# Patient Record
Sex: FEMALE | Race: WHITE | Employment: UNEMPLOYED | URBAN - NONMETROPOLITAN AREA
[De-identification: names, ages, dates, MRNs, and addresses within clinical notes are randomized per-mention and may not be internally consistent; named-entity substitution may affect disease eponyms.]

---

## 2018-09-16 ENCOUNTER — HOSPITAL ENCOUNTER (EMERGENCY)
Age: 33
Discharge: HOME | End: 2018-09-16
Attending: EMERGENCY MEDICINE
Payer: MEDICARE

## 2018-09-16 VITALS
BODY MASS INDEX: 37.3 KG/M2 | RESPIRATION RATE: 16 BRPM | WEIGHT: 190 LBS | OXYGEN SATURATION: 100 % | HEART RATE: 89 BPM | TEMPERATURE: 98.3 F | HEIGHT: 60 IN | SYSTOLIC BLOOD PRESSURE: 129 MMHG | DIASTOLIC BLOOD PRESSURE: 82 MMHG

## 2018-09-16 DIAGNOSIS — R10.9 ACUTE ABDOMINAL PAIN: Primary | ICD-10-CM

## 2018-09-16 DIAGNOSIS — R11.2 NON-INTRACTABLE VOMITING WITH NAUSEA, UNSPECIFIED VOMITING TYPE: ICD-10-CM

## 2018-09-16 LAB
ALBUMIN SERPL-MCNC: 4 G/DL (ref 3.4–5)
ALP BLD-CCNC: 87 U/L (ref 40–129)
ALT SERPL-CCNC: 88 U/L (ref 10–40)
ANION GAP SERPL CALCULATED.3IONS-SCNC: 10 MMOL/L (ref 3–16)
AST SERPL-CCNC: 44 U/L (ref 15–37)
BASOPHILS ABSOLUTE: 0.1 K/UL (ref 0–0.2)
BASOPHILS RELATIVE PERCENT: 0.8 %
BILIRUB SERPL-MCNC: 0.3 MG/DL (ref 0–1)
BILIRUBIN DIRECT: <0.2 MG/DL (ref 0–0.3)
BILIRUBIN URINE: NEGATIVE
BILIRUBIN, INDIRECT: ABNORMAL MG/DL (ref 0–1)
BLOOD, URINE: NEGATIVE
BUN BLDV-MCNC: 20 MG/DL (ref 7–20)
CALCIUM SERPL-MCNC: 9.2 MG/DL (ref 8.3–10.6)
CHLORIDE BLD-SCNC: 102 MMOL/L (ref 99–110)
CLARITY: CLEAR
CO2: 27 MMOL/L (ref 21–32)
COLOR: YELLOW
CREAT SERPL-MCNC: 0.7 MG/DL (ref 0.6–1.1)
EOSINOPHILS ABSOLUTE: 0.3 K/UL (ref 0–0.6)
EOSINOPHILS RELATIVE PERCENT: 3.1 %
GFR AFRICAN AMERICAN: >60
GFR NON-AFRICAN AMERICAN: >60
GLUCOSE BLD-MCNC: 86 MG/DL (ref 70–99)
GLUCOSE URINE: NEGATIVE MG/DL
HCG(URINE) PREGNANCY TEST: NEGATIVE
HCT VFR BLD CALC: 39.3 % (ref 36–48)
HEMOGLOBIN: 13.1 G/DL (ref 12–16)
KETONES, URINE: NEGATIVE MG/DL
LEUKOCYTE ESTERASE, URINE: NEGATIVE
LIPASE: 16 U/L (ref 13–60)
LYMPHOCYTES ABSOLUTE: 2.1 K/UL (ref 1–5.1)
LYMPHOCYTES RELATIVE PERCENT: 22.4 %
MAGNESIUM: 2.1 MG/DL (ref 1.8–2.4)
MCH RBC QN AUTO: 29.2 PG (ref 26–34)
MCHC RBC AUTO-ENTMCNC: 33.3 G/DL (ref 31–36)
MCV RBC AUTO: 87.9 FL (ref 80–100)
MICROSCOPIC EXAMINATION: NORMAL
MONOCYTES ABSOLUTE: 0.7 K/UL (ref 0–1.3)
MONOCYTES RELATIVE PERCENT: 7.1 %
NEUTROPHILS ABSOLUTE: 6.2 K/UL (ref 1.7–7.7)
NEUTROPHILS RELATIVE PERCENT: 66.6 %
NITRITE, URINE: NEGATIVE
PDW BLD-RTO: 13.8 % (ref 12.4–15.4)
PH UA: 6
PLATELET # BLD: 336 K/UL (ref 135–450)
PMV BLD AUTO: 7.3 FL (ref 5–10.5)
POTASSIUM SERPL-SCNC: 4.2 MMOL/L (ref 3.5–5.1)
PROTEIN UA: NEGATIVE MG/DL
RBC # BLD: 4.47 M/UL (ref 4–5.2)
SODIUM BLD-SCNC: 139 MMOL/L (ref 136–145)
SPECIFIC GRAVITY UA: 1.01
TOTAL PROTEIN: 7.8 G/DL (ref 6.4–8.2)
URINE REFLEX TO CULTURE: NORMAL
URINE TYPE: NORMAL
UROBILINOGEN, URINE: 0.2 E.U./DL
WBC # BLD: 9.3 K/UL (ref 4–11)

## 2018-09-16 PROCEDURE — 83735 ASSAY OF MAGNESIUM: CPT

## 2018-09-16 PROCEDURE — 85025 COMPLETE CBC W/AUTO DIFF WBC: CPT

## 2018-09-16 PROCEDURE — 96374 THER/PROPH/DIAG INJ IV PUSH: CPT

## 2018-09-16 PROCEDURE — 81003 URINALYSIS AUTO W/O SCOPE: CPT

## 2018-09-16 PROCEDURE — 80048 BASIC METABOLIC PNL TOTAL CA: CPT

## 2018-09-16 PROCEDURE — 36415 COLL VENOUS BLD VENIPUNCTURE: CPT

## 2018-09-16 PROCEDURE — 83690 ASSAY OF LIPASE: CPT

## 2018-09-16 PROCEDURE — 80076 HEPATIC FUNCTION PANEL: CPT

## 2018-09-16 PROCEDURE — 2580000003 HC RX 258: Performed by: EMERGENCY MEDICINE

## 2018-09-16 PROCEDURE — 6360000002 HC RX W HCPCS: Performed by: EMERGENCY MEDICINE

## 2018-09-16 PROCEDURE — 84703 CHORIONIC GONADOTROPIN ASSAY: CPT

## 2018-09-16 PROCEDURE — 96375 TX/PRO/DX INJ NEW DRUG ADDON: CPT

## 2018-09-16 PROCEDURE — 99284 EMERGENCY DEPT VISIT MOD MDM: CPT

## 2018-09-16 PROCEDURE — 96361 HYDRATE IV INFUSION ADD-ON: CPT

## 2018-09-16 RX ORDER — PROMETHAZINE HYDROCHLORIDE 25 MG/1
25 TABLET ORAL EVERY 6 HOURS PRN
Qty: 20 TABLET | Refills: 0 | Status: SHIPPED | OUTPATIENT
Start: 2018-09-16 | End: 2018-09-23

## 2018-09-16 RX ORDER — ONDANSETRON 2 MG/ML
4 INJECTION INTRAMUSCULAR; INTRAVENOUS ONCE
Status: COMPLETED | OUTPATIENT
Start: 2018-09-16 | End: 2018-09-16

## 2018-09-16 RX ORDER — 0.9 % SODIUM CHLORIDE 0.9 %
1000 INTRAVENOUS SOLUTION INTRAVENOUS ONCE
Status: COMPLETED | OUTPATIENT
Start: 2018-09-16 | End: 2018-09-16

## 2018-09-16 RX ORDER — DICYCLOMINE HYDROCHLORIDE 10 MG/1
10 CAPSULE ORAL EVERY 6 HOURS PRN
Qty: 20 CAPSULE | Refills: 0 | Status: SHIPPED | OUTPATIENT
Start: 2018-09-16 | End: 2018-10-04

## 2018-09-16 RX ORDER — KETOROLAC TROMETHAMINE 30 MG/ML
30 INJECTION, SOLUTION INTRAMUSCULAR; INTRAVENOUS ONCE
Status: COMPLETED | OUTPATIENT
Start: 2018-09-16 | End: 2018-09-16

## 2018-09-16 RX ORDER — FAMOTIDINE 40 MG/1
40 TABLET, FILM COATED ORAL DAILY
Qty: 30 TABLET | Refills: 0 | Status: SHIPPED | OUTPATIENT
Start: 2018-09-16 | End: 2018-10-04

## 2018-09-16 RX ADMIN — ONDANSETRON 4 MG: 2 INJECTION INTRAMUSCULAR; INTRAVENOUS at 10:48

## 2018-09-16 RX ADMIN — SODIUM CHLORIDE 1000 ML: 9 INJECTION, SOLUTION INTRAVENOUS at 10:48

## 2018-09-16 RX ADMIN — KETOROLAC TROMETHAMINE 30 MG: 30 INJECTION, SOLUTION INTRAMUSCULAR at 10:48

## 2018-09-16 ASSESSMENT — PAIN DESCRIPTION - DESCRIPTORS: DESCRIPTORS: SHARP

## 2018-09-16 ASSESSMENT — PAIN SCALES - GENERAL: PAINLEVEL_OUTOF10: 7

## 2018-09-16 ASSESSMENT — PAIN DESCRIPTION - LOCATION: LOCATION: ABDOMEN

## 2018-09-16 ASSESSMENT — PAIN DESCRIPTION - PAIN TYPE: TYPE: ACUTE PAIN

## 2018-09-16 ASSESSMENT — PAIN DESCRIPTION - ORIENTATION: ORIENTATION: LEFT

## 2018-09-16 NOTE — ED PROVIDER NOTES
AM      PATIENT REFERRED TO:  [facility]) Pre-Services  [phone]  Schedule an appointment as soon as possible for a visit       [place] [facility] Emergency Department  Britni Chow [address] [facility], [address] [place] [address]  In 1 day  As needed, If symptoms worsen      DISCHARGE MEDICATIONS:  Discharge Medication List as of 9/16/2018 11:39 AM      START taking these medications    Details   famotidine (PEPCID) 40 MG tablet Take 1 tablet by mouth daily, Disp-30 tablet, R-0Print      promethazine (PHENERGAN) 25 MG tablet Take 1 tablet by mouth every 6 hours as needed for Nausea WARNING:  May cause drowsiness. May impair ability to operate vehicles or machinery.   Do not use in combination with alcohol., Disp-20 tablet, R-0Print      dicyclomine (BENTYL) 10 MG capsule Take 1 capsule by mouth every 6 hours as needed (cramps), Disp-20 capsule, R-0Print             DISCONTINUED MEDICATIONS:  Discharge Medication List as of 9/16/2018 11:39 AM      STOP taking these medications       HYDROcodone-acetaminophen (NORCO) 5-325 MG per tablet Comments:   Reason for Stopping:                      (Please note that portions of this note were completed with a voice recognition program.  Efforts were made to edit the dictations but occasionally words are mis-transcribed.)    Demetrius Cazares DO (electronically signed)            Sukhjinder Montiel DO  09/16/18 1811

## 2018-10-04 ENCOUNTER — APPOINTMENT (OUTPATIENT)
Dept: CT IMAGING | Age: 33
End: 2018-10-04
Payer: MEDICARE

## 2018-10-04 ENCOUNTER — HOSPITAL ENCOUNTER (EMERGENCY)
Age: 33
Discharge: HOME | End: 2018-10-04
Attending: EMERGENCY MEDICINE | Admitting: EMERGENCY MEDICINE
Payer: MEDICARE

## 2018-10-04 VITALS
SYSTOLIC BLOOD PRESSURE: 105 MMHG | OXYGEN SATURATION: 100 % | DIASTOLIC BLOOD PRESSURE: 73 MMHG | TEMPERATURE: 98.4 F | HEIGHT: 60 IN | BODY MASS INDEX: 37.3 KG/M2 | RESPIRATION RATE: 19 BRPM | WEIGHT: 190 LBS | HEART RATE: 75 BPM

## 2018-10-04 DIAGNOSIS — R10.30 LOWER ABDOMINAL PAIN: Primary | ICD-10-CM

## 2018-10-04 LAB
A/G RATIO: 1.2 (ref 1.1–2.2)
ALBUMIN SERPL-MCNC: 4.4 G/DL (ref 3.4–5)
ALP BLD-CCNC: 96 U/L (ref 40–129)
ALT SERPL-CCNC: 73 U/L (ref 10–40)
ANION GAP SERPL CALCULATED.3IONS-SCNC: 13 MMOL/L (ref 3–16)
AST SERPL-CCNC: 44 U/L (ref 15–37)
BASOPHILS ABSOLUTE: 0.1 K/UL (ref 0–0.2)
BASOPHILS RELATIVE PERCENT: 1.1 %
BILIRUB SERPL-MCNC: 0.6 MG/DL (ref 0–1)
BILIRUBIN URINE: NEGATIVE
BLOOD, URINE: NEGATIVE
BUN BLDV-MCNC: 13 MG/DL (ref 7–20)
CALCIUM SERPL-MCNC: 9.4 MG/DL (ref 8.3–10.6)
CHLORIDE BLD-SCNC: 103 MMOL/L (ref 99–110)
CLARITY: CLEAR
CO2: 25 MMOL/L (ref 21–32)
COLOR: YELLOW
CREAT SERPL-MCNC: 0.7 MG/DL (ref 0.6–1.1)
EOSINOPHILS ABSOLUTE: 0.1 K/UL (ref 0–0.6)
EOSINOPHILS RELATIVE PERCENT: 0.9 %
GFR AFRICAN AMERICAN: >60
GFR NON-AFRICAN AMERICAN: >60
GLOBULIN: 3.8 G/DL
GLUCOSE BLD-MCNC: 88 MG/DL (ref 70–99)
GLUCOSE URINE: NEGATIVE MG/DL
HCG(URINE) PREGNANCY TEST: NEGATIVE
HCT VFR BLD CALC: 40.3 % (ref 36–48)
HEMOGLOBIN: 13.8 G/DL (ref 12–16)
KETONES, URINE: NEGATIVE MG/DL
LEUKOCYTE ESTERASE, URINE: NEGATIVE
LIPASE: 14 U/L (ref 13–60)
LYMPHOCYTES ABSOLUTE: 2.5 K/UL (ref 1–5.1)
LYMPHOCYTES RELATIVE PERCENT: 23.3 %
MCH RBC QN AUTO: 29.8 PG (ref 26–34)
MCHC RBC AUTO-ENTMCNC: 34.3 G/DL (ref 31–36)
MCV RBC AUTO: 87.1 FL (ref 80–100)
MICROSCOPIC EXAMINATION: NORMAL
MONOCYTES ABSOLUTE: 0.6 K/UL (ref 0–1.3)
MONOCYTES RELATIVE PERCENT: 5.7 %
NEUTROPHILS ABSOLUTE: 7.4 K/UL (ref 1.7–7.7)
NEUTROPHILS RELATIVE PERCENT: 69 %
NITRITE, URINE: NEGATIVE
PDW BLD-RTO: 13.6 % (ref 12.4–15.4)
PH UA: 6
PLATELET # BLD: 362 K/UL (ref 135–450)
PMV BLD AUTO: 8 FL (ref 5–10.5)
POTASSIUM SERPL-SCNC: 3.8 MMOL/L (ref 3.5–5.1)
PROTEIN UA: NEGATIVE MG/DL
RBC # BLD: 4.63 M/UL (ref 4–5.2)
SODIUM BLD-SCNC: 141 MMOL/L (ref 136–145)
SPECIFIC GRAVITY UA: 1.01
TOTAL PROTEIN: 8.2 G/DL (ref 6.4–8.2)
URINE TYPE: NORMAL
UROBILINOGEN, URINE: 0.2 E.U./DL
WBC # BLD: 10.7 K/UL (ref 4–11)

## 2018-10-04 PROCEDURE — 96376 TX/PRO/DX INJ SAME DRUG ADON: CPT

## 2018-10-04 PROCEDURE — 96375 TX/PRO/DX INJ NEW DRUG ADDON: CPT

## 2018-10-04 PROCEDURE — 6360000004 HC RX CONTRAST MEDICATION: Performed by: PHYSICIAN ASSISTANT

## 2018-10-04 PROCEDURE — 80053 COMPREHEN METABOLIC PANEL: CPT

## 2018-10-04 PROCEDURE — 74177 CT ABD & PELVIS W/CONTRAST: CPT

## 2018-10-04 PROCEDURE — 2580000003 HC RX 258: Performed by: PHYSICIAN ASSISTANT

## 2018-10-04 PROCEDURE — 81003 URINALYSIS AUTO W/O SCOPE: CPT

## 2018-10-04 PROCEDURE — 85025 COMPLETE CBC W/AUTO DIFF WBC: CPT

## 2018-10-04 PROCEDURE — 99284 EMERGENCY DEPT VISIT MOD MDM: CPT

## 2018-10-04 PROCEDURE — 96374 THER/PROPH/DIAG INJ IV PUSH: CPT

## 2018-10-04 PROCEDURE — 96361 HYDRATE IV INFUSION ADD-ON: CPT

## 2018-10-04 PROCEDURE — 83690 ASSAY OF LIPASE: CPT

## 2018-10-04 PROCEDURE — 6360000002 HC RX W HCPCS

## 2018-10-04 PROCEDURE — 6360000002 HC RX W HCPCS: Performed by: PHYSICIAN ASSISTANT

## 2018-10-04 PROCEDURE — 84703 CHORIONIC GONADOTROPIN ASSAY: CPT

## 2018-10-04 RX ORDER — NAPROXEN 500 MG/1
500 TABLET ORAL 2 TIMES DAILY
Qty: 20 TABLET | Refills: 0 | Status: SHIPPED | OUTPATIENT
Start: 2018-10-04 | End: 2018-10-14

## 2018-10-04 RX ORDER — ONDANSETRON 2 MG/ML
4 INJECTION INTRAMUSCULAR; INTRAVENOUS ONCE
Status: COMPLETED | OUTPATIENT
Start: 2018-10-04 | End: 2018-10-04

## 2018-10-04 RX ORDER — ONDANSETRON 4 MG/1
4-8 TABLET, ORALLY DISINTEGRATING ORAL EVERY 12 HOURS PRN
Qty: 12 TABLET | Refills: 0 | Status: SHIPPED | OUTPATIENT
Start: 2018-10-04 | End: 2019-01-02

## 2018-10-04 RX ORDER — 0.9 % SODIUM CHLORIDE 0.9 %
1000 INTRAVENOUS SOLUTION INTRAVENOUS ONCE
Status: COMPLETED | OUTPATIENT
Start: 2018-10-04 | End: 2018-10-04

## 2018-10-04 RX ORDER — MORPHINE SULFATE 2 MG/ML
4 INJECTION, SOLUTION INTRAMUSCULAR; INTRAVENOUS ONCE
Status: COMPLETED | OUTPATIENT
Start: 2018-10-04 | End: 2018-10-04

## 2018-10-04 RX ORDER — KETOROLAC TROMETHAMINE 30 MG/ML
30 INJECTION, SOLUTION INTRAMUSCULAR; INTRAVENOUS ONCE
Status: COMPLETED | OUTPATIENT
Start: 2018-10-04 | End: 2018-10-04

## 2018-10-04 RX ORDER — ONDANSETRON 2 MG/ML
INJECTION INTRAMUSCULAR; INTRAVENOUS
Status: COMPLETED
Start: 2018-10-04 | End: 2018-10-04

## 2018-10-04 RX ADMIN — MORPHINE SULFATE 4 MG: 2 INJECTION, SOLUTION INTRAMUSCULAR; INTRAVENOUS at 12:35

## 2018-10-04 RX ADMIN — IOPAMIDOL 75 ML: 755 INJECTION, SOLUTION INTRAVENOUS at 13:02

## 2018-10-04 RX ADMIN — SODIUM CHLORIDE 1000 ML: 9 INJECTION, SOLUTION INTRAVENOUS at 11:27

## 2018-10-04 RX ADMIN — ONDANSETRON 4 MG: 2 INJECTION INTRAMUSCULAR; INTRAVENOUS at 12:33

## 2018-10-04 RX ADMIN — ONDANSETRON 4 MG: 2 INJECTION INTRAMUSCULAR; INTRAVENOUS at 11:27

## 2018-10-04 RX ADMIN — KETOROLAC TROMETHAMINE 30 MG: 30 INJECTION, SOLUTION INTRAMUSCULAR at 11:27

## 2018-10-04 ASSESSMENT — PAIN DESCRIPTION - PAIN TYPE
TYPE: ACUTE PAIN

## 2018-10-04 ASSESSMENT — PAIN SCALES - GENERAL
PAINLEVEL_OUTOF10: 6
PAINLEVEL_OUTOF10: 8
PAINLEVEL_OUTOF10: 7
PAINLEVEL_OUTOF10: 8
PAINLEVEL_OUTOF10: 8

## 2018-10-04 ASSESSMENT — PAIN DESCRIPTION - ORIENTATION: ORIENTATION: RIGHT;LOWER

## 2018-10-04 ASSESSMENT — PAIN DESCRIPTION - LOCATION
LOCATION: FLANK
LOCATION: ABDOMEN;BACK

## 2018-10-09 ENCOUNTER — HOSPITAL ENCOUNTER (EMERGENCY)
Age: 33
Discharge: HOME | End: 2018-10-09
Attending: EMERGENCY MEDICINE
Payer: MEDICARE

## 2018-10-09 ENCOUNTER — APPOINTMENT (OUTPATIENT)
Dept: GENERAL RADIOLOGY | Age: 33
End: 2018-10-09
Payer: MEDICARE

## 2018-10-09 VITALS
OXYGEN SATURATION: 98 % | DIASTOLIC BLOOD PRESSURE: 74 MMHG | BODY MASS INDEX: 37.3 KG/M2 | SYSTOLIC BLOOD PRESSURE: 113 MMHG | RESPIRATION RATE: 18 BRPM | HEART RATE: 86 BPM | HEIGHT: 60 IN | WEIGHT: 190 LBS | TEMPERATURE: 98.4 F

## 2018-10-09 DIAGNOSIS — J40 BRONCHITIS: Primary | ICD-10-CM

## 2018-10-09 PROCEDURE — 71046 X-RAY EXAM CHEST 2 VIEWS: CPT

## 2018-10-09 PROCEDURE — 99283 EMERGENCY DEPT VISIT LOW MDM: CPT

## 2018-10-09 RX ORDER — AZITHROMYCIN 250 MG/1
TABLET, FILM COATED ORAL
Qty: 1 PACKET | Refills: 0 | Status: SHIPPED | OUTPATIENT
Start: 2018-10-09 | End: 2018-10-13

## 2018-10-09 ASSESSMENT — PAIN DESCRIPTION - PAIN TYPE: TYPE: ACUTE PAIN

## 2018-10-09 ASSESSMENT — PAIN SCALES - GENERAL: PAINLEVEL_OUTOF10: 3

## 2018-10-09 ASSESSMENT — PAIN DESCRIPTION - LOCATION: LOCATION: THROAT

## 2018-10-09 ASSESSMENT — PAIN DESCRIPTION - PROGRESSION: CLINICAL_PROGRESSION: GRADUALLY WORSENING

## 2018-10-09 ASSESSMENT — PAIN DESCRIPTION - FREQUENCY: FREQUENCY: CONTINUOUS

## 2018-10-09 ASSESSMENT — PAIN DESCRIPTION - DESCRIPTORS: DESCRIPTORS: SORE

## 2019-01-02 ENCOUNTER — APPOINTMENT (OUTPATIENT)
Dept: ULTRASOUND IMAGING | Age: 34
End: 2019-01-02
Payer: MEDICARE

## 2019-01-02 ENCOUNTER — HOSPITAL ENCOUNTER (EMERGENCY)
Age: 34
Discharge: HOME | End: 2019-01-02
Payer: MEDICARE

## 2019-01-02 VITALS
RESPIRATION RATE: 12 BRPM | BODY MASS INDEX: 35.34 KG/M2 | WEIGHT: 180 LBS | HEIGHT: 60 IN | DIASTOLIC BLOOD PRESSURE: 86 MMHG | SYSTOLIC BLOOD PRESSURE: 109 MMHG | TEMPERATURE: 98.2 F | HEART RATE: 68 BPM | OXYGEN SATURATION: 100 %

## 2019-01-02 DIAGNOSIS — B96.89 BV (BACTERIAL VAGINOSIS): ICD-10-CM

## 2019-01-02 DIAGNOSIS — N73.0 PID (ACUTE PELVIC INFLAMMATORY DISEASE): Primary | ICD-10-CM

## 2019-01-02 DIAGNOSIS — N76.0 BV (BACTERIAL VAGINOSIS): ICD-10-CM

## 2019-01-02 LAB
A/G RATIO: 1.3 (ref 1.1–2.2)
ALBUMIN SERPL-MCNC: 3.9 G/DL (ref 3.4–5)
ALP BLD-CCNC: 100 U/L (ref 40–129)
ALT SERPL-CCNC: 104 U/L (ref 10–40)
ANION GAP SERPL CALCULATED.3IONS-SCNC: 8 MMOL/L (ref 3–16)
AST SERPL-CCNC: 56 U/L (ref 15–37)
BACTERIA WET PREP: ABNORMAL
BASOPHILS ABSOLUTE: 0.1 K/UL (ref 0–0.2)
BASOPHILS RELATIVE PERCENT: 0.6 %
BILIRUB SERPL-MCNC: 0.5 MG/DL (ref 0–1)
BILIRUBIN URINE: NEGATIVE
BLOOD, URINE: NEGATIVE
BUN BLDV-MCNC: 10 MG/DL (ref 7–20)
CALCIUM SERPL-MCNC: 9.4 MG/DL (ref 8.3–10.6)
CHLORIDE BLD-SCNC: 106 MMOL/L (ref 99–110)
CLARITY: CLEAR
CLUE CELLS: ABNORMAL
CO2: 25 MMOL/L (ref 21–32)
COLOR: NORMAL
CREAT SERPL-MCNC: 0.6 MG/DL (ref 0.6–1.1)
EOSINOPHILS ABSOLUTE: 0.1 K/UL (ref 0–0.6)
EOSINOPHILS RELATIVE PERCENT: 1 %
EPITHELIAL CELLS WET PREP: ABNORMAL
GFR AFRICAN AMERICAN: >60
GFR NON-AFRICAN AMERICAN: >60
GLOBULIN: 2.9 G/DL
GLUCOSE BLD-MCNC: 86 MG/DL (ref 70–99)
GLUCOSE URINE: NEGATIVE MG/DL
HCG QUALITATIVE: NEGATIVE
HCT VFR BLD CALC: 39.1 % (ref 36–48)
HEMOGLOBIN: 12.5 G/DL (ref 12–16)
KETONES, URINE: NEGATIVE MG/DL
LACTIC ACID: 0.5 MMOL/L (ref 0.4–2)
LEUKOCYTE ESTERASE, URINE: NEGATIVE
LIPASE: 11 U/L (ref 13–60)
LYMPHOCYTES ABSOLUTE: 2.6 K/UL (ref 1–5.1)
LYMPHOCYTES RELATIVE PERCENT: 20.2 %
MCH RBC QN AUTO: 28.9 PG (ref 26–34)
MCHC RBC AUTO-ENTMCNC: 32 G/DL (ref 31–36)
MCV RBC AUTO: 90.4 FL (ref 80–100)
MICROSCOPIC EXAMINATION: NORMAL
MONOCYTES ABSOLUTE: 0.6 K/UL (ref 0–1.3)
MONOCYTES RELATIVE PERCENT: 4.5 %
NEUTROPHILS ABSOLUTE: 9.6 K/UL (ref 1.7–7.7)
NEUTROPHILS RELATIVE PERCENT: 73.7 %
NITRITE, URINE: NEGATIVE
PDW BLD-RTO: 13.9 % (ref 12.4–15.4)
PH UA: 7
PLATELET # BLD: 309 K/UL (ref 135–450)
PLATELET SLIDE REVIEW: ADEQUATE
PMV BLD AUTO: 8.5 FL (ref 5–10.5)
POTASSIUM REFLEX MAGNESIUM: 4.4 MMOL/L (ref 3.5–5.1)
PROTEIN UA: NEGATIVE MG/DL
RBC # BLD: 4.33 M/UL (ref 4–5.2)
RBC WET PREP: ABNORMAL
SLIDE REVIEW: ABNORMAL
SODIUM BLD-SCNC: 139 MMOL/L (ref 136–145)
SOURCE WET PREP: ABNORMAL
SPECIFIC GRAVITY UA: <=1.005
TOTAL PROTEIN: 6.8 G/DL (ref 6.4–8.2)
TRICHOMONAS PREP: ABNORMAL
URINE TYPE: NORMAL
UROBILINOGEN, URINE: 0.2 E.U./DL
WBC # BLD: 13 K/UL (ref 4–11)
WBC WET PREP: ABNORMAL
YEAST WET PREP: ABNORMAL

## 2019-01-02 PROCEDURE — 84703 CHORIONIC GONADOTROPIN ASSAY: CPT

## 2019-01-02 PROCEDURE — 96361 HYDRATE IV INFUSION ADD-ON: CPT

## 2019-01-02 PROCEDURE — 80053 COMPREHEN METABOLIC PANEL: CPT

## 2019-01-02 PROCEDURE — 6360000002 HC RX W HCPCS: Performed by: NURSE PRACTITIONER

## 2019-01-02 PROCEDURE — 96372 THER/PROPH/DIAG INJ SC/IM: CPT

## 2019-01-02 PROCEDURE — 83690 ASSAY OF LIPASE: CPT

## 2019-01-02 PROCEDURE — 83605 ASSAY OF LACTIC ACID: CPT

## 2019-01-02 PROCEDURE — 81003 URINALYSIS AUTO W/O SCOPE: CPT

## 2019-01-02 PROCEDURE — 96375 TX/PRO/DX INJ NEW DRUG ADDON: CPT

## 2019-01-02 PROCEDURE — 2580000003 HC RX 258: Performed by: NURSE PRACTITIONER

## 2019-01-02 PROCEDURE — 76856 US EXAM PELVIC COMPLETE: CPT

## 2019-01-02 PROCEDURE — 87210 SMEAR WET MOUNT SALINE/INK: CPT

## 2019-01-02 PROCEDURE — 85025 COMPLETE CBC W/AUTO DIFF WBC: CPT

## 2019-01-02 PROCEDURE — 87491 CHLMYD TRACH DNA AMP PROBE: CPT

## 2019-01-02 PROCEDURE — 99284 EMERGENCY DEPT VISIT MOD MDM: CPT

## 2019-01-02 PROCEDURE — 96374 THER/PROPH/DIAG INJ IV PUSH: CPT

## 2019-01-02 PROCEDURE — 87591 N.GONORRHOEAE DNA AMP PROB: CPT

## 2019-01-02 PROCEDURE — 6370000000 HC RX 637 (ALT 250 FOR IP): Performed by: NURSE PRACTITIONER

## 2019-01-02 RX ORDER — METRONIDAZOLE 250 MG/1
500 TABLET ORAL ONCE
Status: COMPLETED | OUTPATIENT
Start: 2019-01-02 | End: 2019-01-02

## 2019-01-02 RX ORDER — DOXYCYCLINE HYCLATE 100 MG
100 TABLET ORAL ONCE
Status: COMPLETED | OUTPATIENT
Start: 2019-01-02 | End: 2019-01-02

## 2019-01-02 RX ORDER — METRONIDAZOLE 500 MG/1
500 TABLET ORAL 2 TIMES DAILY
Qty: 20 TABLET | Refills: 0 | Status: SHIPPED | OUTPATIENT
Start: 2019-01-02 | End: 2019-01-12

## 2019-01-02 RX ORDER — CEFTRIAXONE SODIUM 250 MG/1
250 INJECTION, POWDER, FOR SOLUTION INTRAMUSCULAR; INTRAVENOUS ONCE
Status: COMPLETED | OUTPATIENT
Start: 2019-01-02 | End: 2019-01-02

## 2019-01-02 RX ORDER — MORPHINE SULFATE 2 MG/ML
4 INJECTION, SOLUTION INTRAMUSCULAR; INTRAVENOUS ONCE
Status: COMPLETED | OUTPATIENT
Start: 2019-01-02 | End: 2019-01-02

## 2019-01-02 RX ORDER — 0.9 % SODIUM CHLORIDE 0.9 %
1000 INTRAVENOUS SOLUTION INTRAVENOUS ONCE
Status: COMPLETED | OUTPATIENT
Start: 2019-01-02 | End: 2019-01-02

## 2019-01-02 RX ORDER — DOXYCYCLINE 100 MG/1
100 TABLET ORAL 2 TIMES DAILY
Qty: 20 TABLET | Refills: 0 | Status: SHIPPED | OUTPATIENT
Start: 2019-01-02 | End: 2019-01-12

## 2019-01-02 RX ORDER — ONDANSETRON 2 MG/ML
4 INJECTION INTRAMUSCULAR; INTRAVENOUS ONCE
Status: COMPLETED | OUTPATIENT
Start: 2019-01-02 | End: 2019-01-02

## 2019-01-02 RX ADMIN — MORPHINE SULFATE 4 MG: 2 INJECTION, SOLUTION INTRAMUSCULAR; INTRAVENOUS at 14:32

## 2019-01-02 RX ADMIN — CEFTRIAXONE SODIUM 250 MG: 250 INJECTION, POWDER, FOR SOLUTION INTRAMUSCULAR; INTRAVENOUS at 16:58

## 2019-01-02 RX ADMIN — DOXYCYCLINE HYCLATE 100 MG: 100 TABLET, COATED ORAL at 16:58

## 2019-01-02 RX ADMIN — ONDANSETRON 4 MG: 2 INJECTION INTRAMUSCULAR; INTRAVENOUS at 14:32

## 2019-01-02 RX ADMIN — SODIUM CHLORIDE 1000 ML: 9 INJECTION, SOLUTION INTRAVENOUS at 13:25

## 2019-01-02 RX ADMIN — METRONIDAZOLE 500 MG: 250 TABLET ORAL at 16:58

## 2019-01-02 ASSESSMENT — ENCOUNTER SYMPTOMS
SHORTNESS OF BREATH: 0
ABDOMINAL PAIN: 1
VOMITING: 1

## 2019-01-02 ASSESSMENT — PAIN SCALES - WONG BAKER
WONGBAKER_NUMERICALRESPONSE: 6
WONGBAKER_NUMERICALRESPONSE: 4

## 2019-01-02 ASSESSMENT — PAIN DESCRIPTION - LOCATION
LOCATION: ABDOMEN
LOCATION: VAGINA;ABDOMEN
LOCATION: ABDOMEN
LOCATION: ABDOMEN

## 2019-01-02 ASSESSMENT — PAIN DESCRIPTION - PAIN TYPE
TYPE: ACUTE PAIN

## 2019-01-02 ASSESSMENT — PAIN SCALES - GENERAL
PAINLEVEL_OUTOF10: 4
PAINLEVEL_OUTOF10: 8
PAINLEVEL_OUTOF10: 8
PAINLEVEL_OUTOF10: 6
PAINLEVEL_OUTOF10: 8

## 2019-01-02 ASSESSMENT — PAIN DESCRIPTION - FREQUENCY
FREQUENCY: CONTINUOUS
FREQUENCY: CONTINUOUS

## 2019-01-02 ASSESSMENT — PAIN DESCRIPTION - DESCRIPTORS: DESCRIPTORS: STABBING

## 2019-01-04 LAB
C TRACH DNA GENITAL QL NAA+PROBE: NEGATIVE
N. GONORRHOEAE DNA: NEGATIVE

## 2022-07-25 ENCOUNTER — HOSPITAL ENCOUNTER (EMERGENCY)
Age: 37
Discharge: HOME | End: 2022-07-25
Attending: EMERGENCY MEDICINE
Payer: COMMERCIAL

## 2022-07-25 VITALS
HEART RATE: 97 BPM | RESPIRATION RATE: 16 BRPM | DIASTOLIC BLOOD PRESSURE: 83 MMHG | TEMPERATURE: 98.3 F | WEIGHT: 135 LBS | BODY MASS INDEX: 26.5 KG/M2 | HEIGHT: 60 IN | OXYGEN SATURATION: 100 % | SYSTOLIC BLOOD PRESSURE: 121 MMHG

## 2022-07-25 DIAGNOSIS — Z00.8 ENCOUNTER FOR PSYCHOLOGICAL EVALUATION: Primary | ICD-10-CM

## 2022-07-25 DIAGNOSIS — S61.512A SELF-INFLICTED LACERATION OF LEFT WRIST (HCC): ICD-10-CM
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
## 2022-07-25 LAB
AMPHETAMINE SCREEN, URINE: POSITIVE
BARBITURATE SCREEN URINE: ABNORMAL
BENZODIAZEPINE SCREEN, URINE: ABNORMAL
BILIRUBIN URINE: NEGATIVE
BLOOD, URINE: NEGATIVE
CANNABINOID SCREEN URINE: ABNORMAL
CLARITY: CLEAR
COCAINE METABOLITE SCREEN URINE: ABNORMAL
COLOR: YELLOW
GLUCOSE URINE: NEGATIVE MG/DL
KETONES, URINE: NEGATIVE MG/DL
LEUKOCYTE ESTERASE, URINE: NEGATIVE
Lab: ABNORMAL
METHADONE SCREEN, URINE: ABNORMAL
MICROSCOPIC EXAMINATION: NORMAL
NITRITE, URINE: NEGATIVE
OPIATE SCREEN URINE: ABNORMAL
OXYCODONE URINE: ABNORMAL
PH UA: 6.5
PH UA: 6.5 (ref 5–8)
PHENCYCLIDINE SCREEN URINE: ABNORMAL
PROPOXYPHENE SCREEN: ABNORMAL
PROTEIN UA: NEGATIVE MG/DL
SPECIFIC GRAVITY UA: <=1.005 (ref 1–1.03)
URINE REFLEX TO CULTURE: NORMAL
URINE TYPE: NORMAL
UROBILINOGEN, URINE: 0.2 E.U./DL

## 2022-07-25 PROCEDURE — 99283 EMERGENCY DEPT VISIT LOW MDM: CPT

## 2022-07-25 PROCEDURE — 80307 DRUG TEST PRSMV CHEM ANLYZR: CPT

## 2022-07-25 PROCEDURE — 81003 URINALYSIS AUTO W/O SCOPE: CPT

## 2022-07-25 ASSESSMENT — ENCOUNTER SYMPTOMS
EYE PAIN: 0
VOMITING: 0
NAUSEA: 0
ABDOMINAL PAIN: 0
RHINORRHEA: 0
SHORTNESS OF BREATH: 0
DIARRHEA: 0
CONSTIPATION: 0
COUGH: 0
SORE THROAT: 0
BACK PAIN: 0

## 2022-07-25 ASSESSMENT — PAIN - FUNCTIONAL ASSESSMENT: PAIN_FUNCTIONAL_ASSESSMENT: NONE - DENIES PAIN

## 2022-07-25 NOTE — ED NOTES
Presenting Problem: Patient presents to [facility] AN [facility]FFILIATE OF [facility] voluntarily after she was brought in by her PO. Pt states she became upset today due to some recent stress and she made several superficial lacerations with a piece of glass to the top of left forearm and to her neck. Pt states she typically cuts as self-harm when she becomes stressed. She denies that this was a suicide attempt. Pt reports recent stress from caring for her father with mobility issues and other health conditions. She also discovered that a tree branch broke out a window in her home last night and she felt that was, \"my breaking point. \" Pt states her  came for a visit today and saw the lacerations and wanted her to come to the ED to be checked out medically. Pt is adamant that she is not suicidal and denies all plan and intent. She identifies her father and children as reasons to live and is future oriented with plans to attend a job interview tomorrow. She has plans to obtain custody of her daughter in the future and is also looking forward to getting off parole this month. Appearance/Hygiene:  well-appearing, hospital attire, seated in bed, good grooming, and good hygiene   Motor Behavior: WNL   Attitude: cooperative  Affect: normal affect   Speech: normal pitch and normal volume  Mood: euthymic   Thought Processes: Goal directed  Perceptions: Absent   Thought content: future oriented    Orientation: A&Ox4   Memory: intact  Concentration: Good    Insight/ judgement: impaired judgment      Psychosocial and contextual factors: Pt lives in a home with her father. 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 She has three children, two older sons and a younger daughter. She is in the process of fixing her home, finding a job, and getting her 's license so she can obtain custody of her daughter.  She is trained as a  and has a job interview tomorrow at 2:30pm. She is also waiting to her back from another job. Pt is currently on parole after a burglary charge and she is looking forward to getting off parole this month. C-SSRS flowsheet is complete. Psychiatric History (including current outpatient provider and past inpatient admissions): Pt reports hx of one previous suicide attempt at age 24 by OD on Tylenol after she found out her boyfriend was cheating on her. Pt believes she was admitted here at [facility]. She denies all other hospitalizations or suicide attempts. She is currently active in counseling at [facility] in [facility] and has plans to see her counselor this month.     Access to Firearms: Denies    ASSESSMENT FOR IMMINENT FUTURE DANGER:    RISK FACTORS:    []  Age <25 or >49   []  Male gender   []  Depressed mood   []  Active suicidal ideation   []  Suicide plan   []  Suicide attempt   []  Access to lethal means   [x]  Prior suicide attempt   []  Active substance abuse    []  Highly impulsive behaviors   []  Not attending to self-care/ADLs    []  Recent significant loss   []  Chronic pain or medical illness   []  Social isolation   []  History of violence    []  Active psychosis   []  Cognitive impairment    []  No outpatient services in place   []  Medication noncompliance   []  No collateral information to support safety  [x] Self- injurious/ Self-harm behavior    PROTECTIVE FACTORS:  [x] Age >25 and <55  [x] Female gender   [x] Denies depression  [x] Denies suicidal ideation  [x] Does not have lethal plan   [x] Does not have access to guns or weapons  [x] Patient is verbally mary for safety  [] No prior suicide attempts  [x] No active substance abuse  [x] Patient has social or family support  [x] No active psychosis or cognitive dysfunction  [x] Physically healthy  [x] Has outpatient services in place  [x] Compliant with recommended medications  [x] Collateral information from father, Dao, supports patient safety   [x] Patient is future oriented with plans to attend a job interview tomorrow at [employer], [facility] [place], [place]  07/25/22 8563

## 2022-07-25 NOTE — ED PROVIDER NOTES
I independently performed a history and physical on [address]. I personally saw the patient and performed a substantive portion of the visit including all aspects of the medical decision making. All diagnostic, treatment, and disposition decisions were made by myself in conjunction with the advanced practice provider. I have participated in the medical decision making and directed the treatment plan and disposition of the patient. For further details of [address] emergency department encounter, please see the advanced practice provider's documentation. CHIEF COMPLAINT  Chief Complaint   Patient presents with    Psychiatric Evaluation     Pt brought to ED by parole officers for self inflicted superficial lacerations. Pt states \"I was angry and was taking it out on myself\". Pt denies SI/HI. Reports marijuana use.  reports pt made a statement about ending her life. Reports pt is voluntary. Briefly, [address] is a 29 y.o. female  who presents to the ED complaining of injurious behavior    FOCUSED PHYSICAL EXAMINATION  /83   Pulse 97   Temp 98.3 °F (36.8 °C) (Oral)   Resp 16   Ht 5' (1.524 m)   Wt 135 lb (61.2 kg)   SpO2 100%   BMI 26.37 kg/m²      Focused physical examination:  General appearance:  Cooperative. No acute distress. Skin:  Warm. Dry. Superficial abrasions and lacerations to the left forearm and neck. Eye:  Extraocular movements intact. Ears, nose, mouth and throat:  Oral mucosa moist,  Neck:  Trachea midline. Heart:  Regular rate and rhythm  Perfusion:  intact  Respiratory:  Respirations nonlabored. Lungs clear to auscultation bilaterally. Abdominal:   Non distended. Nontender  Neurological:  Alert and oriented x 3. Moves all extremities spontaneously  Musculoskeletal:   Normal ROM, no deformities          Psychiatric: Mildly pressured speech. Denies suicidal or homicidal ideations.       MDM: Patient presents to the emergency department today after self-injurious behavior yesterday. Reportedly voiced that she wanted to kill her self. Denies suicidal thoughts here. States when she cuts her self which is helped her to relieve pain and feel better when she is stressed out. Denies hallucinations. Denies suicidal thoughts. Has been cleared by psychiatry. No concern for overdose. During the patient's ED course, the patient was given:  Medications - No data to display     CLINICAL IMPRESSION  1. Encounter for psychological evaluation    2. Self-inflicted laceration of left wrist [facility])        DISPOSITION  Home      This chart was created using Dragon dictation software. Efforts were made by me to ensure accuracy, however some errors may be present due to limitations of this technology.             Emilie Moses MD  07/25/22 8782

## 2022-07-25 NOTE — ED NOTES
Pt changed into gown and belongings secured in locker. Reportedly brought in by  for self inflicted superficial lacerations. Pt denies suicidal ideation. \" I just took my frustration out on myself, that's all\".       Fredrick Mackay RN  07/25/22 3728

## 2022-07-25 NOTE — ED PROVIDER NOTES
[address] ED  EMERGENCY DEPARTMENT ENCOUNTER        Pt Name: Michael Sanchez  MRN: [id number]  Larrygflauren 1985  Date of evaluation: 7/25/2022  Provider: VANIA Orellana CNP  PCP: No primary care provider on file. This patient was seen and evaluated by the attending physician Yusra Donovan MD    I have evaluated this patient. My supervising physician was available for consultation. CHIEF COMPLAINT       Chief Complaint   Patient presents with    Psychiatric Evaluation     Pt brought to ED by parole officers for self inflicted superficial lacerations. Pt states \"I was angry and was taking it out on myself\". Pt denies SI/HI. Reports marijuana use.  reports pt made a statement about ending her life. Reports pt is voluntary. HISTORY OF PRESENT ILLNESS   (Location/Symptom, Timing/Onset, Context/Setting, Quality, Duration, Modifying Factors, Severity)  Note limiting factors. Michael Sanchez is a 29 y.o. female who presents via private car for psychiatric evaluation, cutting. Onset was yesterday. Duration has been since the onset. Context includes patient presents to the emergency department today for evaluation of self-harm behaviors. She is in the presence of her  however is not under arrest and is on the psychiatric [place] at this time. The patient states that she became frustrated yesterday after a tree branch fell and broke a window in her home. She states that she used pieces of glass and placed some superficial cuts to her left forearm. She states that she has done this several times in the past and uses this as a coping mechanism. She does not endorse any suicidal or homicidal ideation at this time. She denies any visual or auditory hallucinations. Quality is nothing with radiation to nothing. Alleviating factors include nothing. Aggravating factors include nothing. Pain is 0/10. Nothing has been used for pain today. Chart review reveals pt has significant PMHx of hepatitis C, IV drug use. Nursing Notes were all reviewed and agreed with or any disagreements were addressed  in the HPI. Pt was seen during the  pandemic. Appropriate PPE worn by ME during patient encounters. Pt seen during a time with constrained hospital bed capacity and other potential inpatient and outpatient resources were constrained due to the viral pandemic. REVIEW OF SYSTEMS    (2-9 systems for level 4, 10 or more for level 5)     Review of Systems   Constitutional:  Negative for chills, diaphoresis and fever. HENT:  Negative for congestion, rhinorrhea and sore throat. Eyes:  Negative for pain and visual disturbance. Respiratory:  Negative for cough and shortness of breath. Cardiovascular:  Negative for chest pain and leg swelling. Gastrointestinal:  Negative for abdominal pain, constipation, diarrhea, nausea and vomiting. Genitourinary:  Negative for dysuria, frequency and urgency. Musculoskeletal:  Negative for back pain and neck pain. Skin:  Negative for rash and wound. Neurological:  Negative for dizziness and light-headedness. Psychiatric/Behavioral:  Positive for self-injury. Negative for agitation, behavioral problems, confusion, hallucinations, sleep disturbance and suicidal ideas. The patient is not nervous/anxious. Multiple superficial lacerations to the left forearm     Positives and Pertinent negatives as per HPI. Except as noted abovein the ROS, all other systems were reviewed and negative.        PAST MEDICAL HISTORY     Past Medical History:   Diagnosis Date    Hepatitis C     IV drug user     Postpartum depression     Bipolar after first child    Varicosities     bilateral legs varicosities         SURGICAL HISTORY     Past Surgical History:   Procedure Laterality Date    CARPAL TUNNEL RELEASE  209    left     SECTION      CHOLECYSTECTOMY      GALLBLADDER SURGERY  2008    ULNAR TUNNEL RELEASE  2008    reconstruction         CURRENTMEDICATIONS       Previous Medications    IBUPROFEN (ADVIL;MOTRIN) 800 MG TABLET    Take 1 tablet by mouth every 6 hours as needed for Pain. NAPROXEN (NAPROSYN) 500 MG TABLET    Take 1 tablet by mouth 2 times daily for 20 doses    OMEPRAZOLE (PRILOSEC) 40 MG CAPSULE    Take 1 capsule by mouth daily for 30 days. ALLERGIES     Motrin [ibuprofen]    FAMILYHISTORY       Family History   Problem Relation Age of Onset    Arthritis Mother     Depression Mother     Diabetes Mother     High Blood Pressure Mother     Mental Illness Mother     Stroke Mother     Substance Abuse Mother     Arthritis Father     Depression Father     High Blood Pressure Father     Mental Illness Father     Substance Abuse Father     Substance Abuse Sister     Arthritis Maternal Grandmother     Diabetes Maternal Grandmother     Heart Disease Maternal Grandmother     Substance Abuse Maternal Grandmother     Arthritis Maternal Grandfather     Arthritis Paternal Grandmother     Arthritis Paternal Grandfather     Substance Abuse Sister     Substance Abuse Sister     Substance Abuse Sister     Substance Abuse Sister           SOCIAL HISTORY       Social History     Socioeconomic History    Marital status:      Spouse name: None    Number of children: None    Years of education: None    Highest education level: None   Tobacco Use    Smoking status: Every Day     Packs/day: 0.50     Years: 19.00     Pack years: 9.50     Types: Cigarettes    Smokeless tobacco: Never   Substance and Sexual Activity    Alcohol use: No     Comment: couple times a month    Drug use: Yes     Types: IV     Comment: Hx of drug use.  States she last used 2015    Sexual activity: Yes     Partners: Male       SCREENINGS    [place] Coma Scale  Eye Opening: Spontaneous  Best Verbal Response: Oriented  Best Motor Response: Obeys commands  Sunday Coma Scale Score: 15        PHYSICAL EXAM    (up to 7 for level 4, 8 or more for level 5)     ED Triage Vitals [07/25/22 1118]   BP Temp Temp Source Heart Rate Resp SpO2 Height Weight   121/83 98.3 °F (36.8 °C) Oral 97 16 100 % 5' (1.524 m) 135 lb (61.2 kg)       Physical Exam  Vitals and nursing note reviewed. Constitutional:       Appearance: Normal appearance. She is not ill-appearing or diaphoretic. HENT:      Head: Normocephalic and atraumatic. Right Ear: External ear normal.      Left Ear: External ear normal.   Eyes:      General:         Right eye: No discharge. Left eye: No discharge. Pulmonary:      Effort: Pulmonary effort is normal. No respiratory distress. Musculoskeletal:         General: Normal range of motion. Cervical back: Normal range of motion and neck supple. Comments: Multiple superficial lacerations to the dorsal surface of the left forearm extending from the wrist to the elbow. Lacerations requiring repair. Wounds have been cleansed and are not erythematous or exhibiting any drainage from the wounds. Patient denies any pain. Skin:     General: Skin is warm and dry. Capillary Refill: Capillary refill takes less than 2 seconds. Neurological:      General: No focal deficit present. Mental Status: She is alert and oriented to person, place, and time. Psychiatric:         Attention and Perception: Attention and perception normal.         Mood and Affect: Mood and affect normal.         Speech: Speech normal.         Behavior: Behavior normal.         Thought Content:  Thought content normal.         Cognition and Memory: Cognition and memory normal.         Judgment: Judgment normal.     PHYSICAL EXAM  /83   Pulse 97   Temp 98.3 °F (36.8 °C) (Oral)   Resp 16   Ht 5' (1.524 m)   Wt 135 lb (61.2 kg)   SpO2 100%   BMI 26.37 kg/m²       DIAGNOSTIC RESULTS   LABS:    Labs Reviewed   URINE DRUG SCREEN - Abnormal; Notable for the following components:       Result Value    Amphetamine Screen, Urine POSITIVE (*) All other components within normal limits   URINALYSIS WITH REFLEX TO CULTURE       All other labs were within normal range or not returned as of this dictation. EKG: All EKG's are interpreted by the Emergency Department Physician who either signs orCo-signs this chart in the absence of a cardiologist.  Please see their note for interpretation of EKG. RADIOLOGY:   Non-plain film images such as CT, Ultrasound and MRI are read by the radiologist. Plain radiographic images are visualized andpreliminarily interpreted by the  ED Provider with the below findings:        Interpretation perthe Radiologist below, if available at the time of this note:    No orders to display     No results found. PROCEDURES   Unless otherwise noted below, none     Procedures    CRITICAL CARE TIME   N/A    CONSULTS:  None      EMERGENCY DEPARTMENT COURSE and DIFFERENTIALDIAGNOSIS/MDM:   Vitals:    Vitals:    07/25/22 1118   BP: 121/83   Pulse: 97   Resp: 16   Temp: 98.3 °F (36.8 °C)   TempSrc: Oral   SpO2: 100%   Weight: 135 lb (61.2 kg)   Height: 5' (1.524 m)       Patient was given thefollowing medications:  Medications - No data to display    PDMP Monitoring:    Last PDMP Scott Haynes as Reviewed [facility]):  Review User Review Instant Review Result            Urine Drug Screenings (1 yr)       Drug screen multi urine  Collected: 7/25/2022 11:45 AM (Final result)              Drug screen multi urine  Collected: 7/15/2015  4:30 PM (Final result)                  Medication Contract and Consent for Opioid Use Documents Filed        No documents found                    MDM:   This patient was seen and evaluated by myself and my attending physician. She presents to the emergency department today for evaluation of superficial lacerations to her left forearm that she sustained yesterday. On exam she is alert and oriented, hemodynamically stable and nontoxic in appearance. Urinalysis grossly negative.   Urine drug screen revealed positive for amphetamines. No other laboratory studies requested by behavioral health team.  Behavioral health team denied need for laboratory studies as the patient does not endorse that she is suicidal or homicidal.  She does have a long history of self-harm behaviors. She has multiple healing lacerations to her upper and lower extremities and states that she commits superficial cutting for \"stress relief and anxiety\". She is future oriented and is looking forward to a job interview that she has tomorrow. Behavioral health team did speak with the patient's father who stated he had no concerns for suicidal or homicidal ideation and stated that she had a long history of cutting as well. No lacerations on the left forearm appeared to need any repair and had already been cleansed prior to coming to the emergency department. The lacerations did not exhibit erythema, drainage from the wounds, streaking from the wounds and the patient denied any fevers or pain. She was provided with follow-up precautions for her wounds including monitoring them for signs of infection including but not limited to redness, drainage, increasing pain or fevers. Resources were provided by behavioral health team and they felt the patient was safe for discharge at this time. Is this patient to be included in the SEP-1 Core Measure due to severe sepsis or septic shock? No   Exclusion criteria - the patient is NOT to be included for SEP-1 Core Measure due to: Infection is not suspected    Discharge Time out:  CC Reviewed Yes   Test Results Yes     Vitals:    07/25/22 1118   BP: 121/83   Pulse: 97   Resp: 16   Temp: 98.3 °F (36.8 °C)   SpO2: 100%              FINAL IMPRESSION      1. Encounter for psychological evaluation    2.  Self-inflicted laceration of left wrist [facility])          DISPOSITION/PLAN   DISPOSITION Decision To Discharge 07/25/2022 01:29:11 PM      PATIENT REFERRED[facility]:  [facility]  [facility] [address]  [phone]    As needed, If symptoms worsen    [facility]) Pre-Services  [phone]        DISCHARGE MEDICATIONS:  New Prescriptions    No medications on file       DISCONTINUED MEDICATIONS:  Discontinued Medications    No medications on file              (Please note that portions ofthis note were completed with a voice recognition program.  Efforts were made to edit the dictations but occasionally words are mis-transcribed.)    VANIA Negrete CNP (electronically signed)       VANIA Negrete CNP  07/25/22 2057

## 2022-07-25 NOTE — ED NOTES
Level of Care Disposition: Discharge    Patient was seen by ED provider and [facility] AN AFFILIATE OF [facility] staff. The case was presented to psychiatric provider on-call Dr. Donna Elmore.   Based on the ED evaluation and information presented to the provider by this writer, it is the recommendation of the on call psychiatric provider that the patient be discharged from a psychiatric standpoint with the following referrals: [facility]               [address] [place] [address]  07/25/22 2753

## 2022-07-25 NOTE — ED NOTES
Collateral Contact:  Name:   Phone: [phone]  Relation to Patient: Father  Last Contact with Patient: Today  Concerns: Father states he is aware that pt self-harms to cope with stress. He states, \"She's done this her whole life. \" Father states pt was not trying to kill herself today when she self-harmed and stated, \"She just made some scratches that's all. \" Father states he feels completely safe with pt returning home if discharged and states he will be with her. He denied any other concerns for her safety. Father, Dao, is supportive of plan to discharge [address].      [address] [facility] [place] [place]  07/25/22 4760

## 2022-07-25 NOTE — DISCHARGE INSTR - COC
Continuity of Care Form    Patient Name: Brigitte Stark   :  1985  MRN:  [id number]    Admit date:  2022  Discharge date:  ***    Code Status Order: Prior   Advance Directives:     Admitting Physician:  No admitting provider for patient encounter. PCP: No primary care provider on file. Discharging Nurse: [facility] Unit/Room#: B4/B4  Discharging Unit Phone Number: ***    Emergency Contact:   Extended Emergency Contact Information  Primary Emergency Contact: Marely Hawthorne [address] Phone: [phone]  Relation: Other  Secondary Emergency Contact: Magalis Urbano [address] Phone: [phone]  Relation: Other    Past Surgical History:  Past Surgical History:   Procedure Laterality Date    CARPAL TUNNEL RELEASE  209    left     SECTION      CHOLECYSTECTOMY      GALLBLADDER SURGERY      ULNAR TUNNEL RELEASE      reconstruction       Immunization History: There is no immunization history on file for this patient. Active Problems: There is no problem list on file for this patient.       Isolation/Infection:   Isolation            No Isolation          Patient Infection Status       None to display            Nurse Assessment:  Last Vital Signs: /83   Pulse 97   Temp 98.3 °F (36.8 °C) (Oral)   Resp 16   Ht 5' (1.524 m)   Wt 135 lb (61.2 kg)   SpO2 100%   BMI 26.37 kg/m²     Last documented pain score (0-10 scale):    Last Weight:   Wt Readings from Last 1 Encounters:   22 135 lb (61.2 kg)     Mental Status:  {IP PT MENTAL STATUS:80807}    IV Access:  { SHRUTHI IV ACCESS:319676705}    Nursing Mobility/ADLs:  Walking   {CHP DME VVQE:989716039}  Transfer  {CHP DME NLJV:388935325}  Bathing  {CHP DME RMBP:156904394}  Dressing  {CHP DME QRVH:137270069}  Toileting  {CHP DME ZLER:245008644}  Feeding  {CHP DME IHTB:158762037}  Med Admin  {CHP DME IAXZ:643987094}  Med Delivery   { SHRUTHI MED Delivery:247373715}    Wound Care Documentation and Therapy:        Elimination:  Continence: Bowel: {YES / ZD:13053}  Bladder: {YES / YZ:08426}  Urinary Catheter: {Urinary Catheter:443221513}   Colostomy/Ileostomy/Ileal Conduit: {YES / OH:60748}       Date of Last BM: ***  No intake or output data in the 24 hours ending 22 1207  No intake/output data recorded.     Safety Concerns:     [address] [facility] [employer] Safety Concerns:156799588}    Impairments/Disabilities:      [facility] Impairments/Disabilities:773751657}    Nutrition Therapy:  Current Nutrition Therapy:   [facility] Diet List:682548630}    Routes of Feeding: {CHP DME Other Feedings:677847881}  Liquids: {Slp liquid thickness:26912}  Daily Fluid Restriction: {CHP DME Yes amt example:456209493}  Last Modified Barium Swallow with Video (Video Swallowing Test): {Done Not Done OVQB:457754160}    Treatments at the Time of Hospital Discharge:   Respiratory Treatments: ***  Oxygen Therapy:  {Therapy; copd oxygen:30019}  Ventilator:    {[facility] Vent WBVV:965297844}    Rehab Therapies: {THERAPEUTIC INTERVENTION:7779868413}  Weight Bearing Status/Restrictions: [facility] Weight Bearin}  Other Medical Equipment (for information only, NOT a DME order):  {EQUIPMENT:297483490}  Other Treatments: ***    Patient's personal belongings (please select all that are sent with patient):  {[facility] DME Belongings:483097224}    RN SIGNATURE:  {Esignature:470617553}    CASE MANAGEMENT/SOCIAL WORK SECTION    Inpatient Status Date: ***    Readmission Risk Assessment Score:  Readmission Risk              Risk of Unplanned Readmission:  0           Discharging to Facility/ Agency   Name:   Address:  Phone:  Fax:    Dialysis Facility (if applicable)   Name:  Address:  Dialysis Schedule:  Phone:  Fax:    / signature: {Esignature:893875352}    PHYSICIAN SECTION    Prognosis: {Prognosis:8424521574}    Condition at Discharge: [facility] Patient Condition:545034961}    Rehab Potential (if transferring to Rehab): {Prognosis:8514541469}    Recommended Labs or Other Treatments After Discharge: ***    Physician Certification: I certify the above information and transfer of Radha Duff  is necessary for the continuing treatment of the diagnosis listed and that she requires {Admit to Appropriate Level of Care:21671} for {GREATER/LESS:323562837} 30 days.      Update Admission H&P: {CHP DME Changes in TOBEO:572291913}    PHYSICIAN SIGNATURE:  {Esignature:789961192}

## 2022-07-25 NOTE — DISCHARGE INSTRUCTIONS
Monitor your wounds for signs of infection including redness, drainage from the wounds, streaking from the wound increasing pain or fevers. You notice any of the signs please return to the emergency department for evaluation. [facility] crisis number is [phone]. Please call this number if you are experiencing a crisis situation. The crisis number for [facility] is [phone]. This crisis line is available 24 hours a day, seven days a week. Please follow up with your psychiatrist at [address] in [facility].   Address: [address] [place] [address]   Phone: [phone]

## 2024-06-02 ENCOUNTER — HOSPITAL ENCOUNTER (EMERGENCY)
Age: 39
Discharge: HOME | End: 2024-06-02
Payer: COMMERCIAL

## 2024-06-02 VITALS
OXYGEN SATURATION: 100 % | SYSTOLIC BLOOD PRESSURE: 127 MMHG | BODY MASS INDEX: 28.96 KG/M2 | HEIGHT: 60 IN | DIASTOLIC BLOOD PRESSURE: 83 MMHG | TEMPERATURE: 98.1 F | RESPIRATION RATE: 18 BRPM | HEART RATE: 80 BPM | WEIGHT: 147.5 LBS

## 2024-06-02 DIAGNOSIS — L02.91 ABSCESS: Primary | ICD-10-CM

## 2024-06-02 PROCEDURE — 6370000000 HC RX 637 (ALT 250 FOR IP): Performed by: PHYSICIAN ASSISTANT

## 2024-06-02 PROCEDURE — 10060 I&D ABSCESS SIMPLE/SINGLE: CPT

## 2024-06-02 PROCEDURE — 99283 EMERGENCY DEPT VISIT LOW MDM: CPT

## 2024-06-02 RX ORDER — CEPHALEXIN 500 MG/1
500 CAPSULE ORAL ONCE
Status: COMPLETED | OUTPATIENT
Start: 2024-06-02 | End: 2024-06-02

## 2024-06-02 RX ORDER — SULFAMETHOXAZOLE AND TRIMETHOPRIM 800; 160 MG/1; MG/1
1 TABLET ORAL ONCE
Status: COMPLETED | OUTPATIENT
Start: 2024-06-02 | End: 2024-06-02

## 2024-06-02 RX ORDER — SULFAMETHOXAZOLE AND TRIMETHOPRIM 800; 160 MG/1; MG/1
1 TABLET ORAL 2 TIMES DAILY
Qty: 20 TABLET | Refills: 0 | Status: SHIPPED | OUTPATIENT
Start: 2024-06-02 | End: 2024-06-12

## 2024-06-02 RX ORDER — CEPHALEXIN 500 MG/1
500 CAPSULE ORAL 4 TIMES DAILY
Qty: 40 CAPSULE | Refills: 0 | Status: SHIPPED | OUTPATIENT
Start: 2024-06-02 | End: 2024-06-12

## 2024-06-02 RX ORDER — FLUCONAZOLE 150 MG/1
150 TABLET ORAL ONCE
Qty: 1 TABLET | Refills: 0 | Status: SHIPPED | OUTPATIENT
Start: 2024-06-02 | End: 2024-06-02

## 2024-06-02 RX ORDER — ONDANSETRON 4 MG/1
4 TABLET, ORALLY DISINTEGRATING ORAL EVERY 8 HOURS PRN
Qty: 10 TABLET | Refills: 0 | Status: SHIPPED | OUTPATIENT
Start: 2024-06-02

## 2024-06-02 RX ORDER — ONDANSETRON 4 MG/1
4 TABLET, ORALLY DISINTEGRATING ORAL ONCE
Status: COMPLETED | OUTPATIENT
Start: 2024-06-02 | End: 2024-06-02

## 2024-06-02 RX ORDER — IBUPROFEN 200 MG
200 TABLET ORAL EVERY 6 HOURS PRN
COMMUNITY

## 2024-06-02 RX ADMIN — ONDANSETRON 4 MG: 4 TABLET, ORALLY DISINTEGRATING ORAL at 18:08

## 2024-06-02 RX ADMIN — SULFAMETHOXAZOLE AND TRIMETHOPRIM 1 TABLET: 800; 160 TABLET ORAL at 18:08

## 2024-06-02 RX ADMIN — CEPHALEXIN 500 MG: 500 CAPSULE ORAL at 18:08

## 2024-06-02 ASSESSMENT — PAIN DESCRIPTION - DESCRIPTORS: DESCRIPTORS: THROBBING

## 2024-06-02 ASSESSMENT — PAIN DESCRIPTION - ORIENTATION: ORIENTATION: RIGHT

## 2024-06-02 ASSESSMENT — PAIN - FUNCTIONAL ASSESSMENT: PAIN_FUNCTIONAL_ASSESSMENT: 0-10

## 2024-06-02 ASSESSMENT — PAIN SCALES - GENERAL: PAINLEVEL_OUTOF10: 10

## 2024-06-02 ASSESSMENT — LIFESTYLE VARIABLES
HOW MANY STANDARD DRINKS CONTAINING ALCOHOL DO YOU HAVE ON A TYPICAL DAY: PATIENT DOES NOT DRINK
HOW OFTEN DO YOU HAVE A DRINK CONTAINING ALCOHOL: NEVER

## 2024-06-02 ASSESSMENT — PAIN DESCRIPTION - LOCATION: LOCATION: ABDOMEN

## 2024-06-02 ASSESSMENT — PAIN DESCRIPTION - FREQUENCY: FREQUENCY: INTERMITTENT

## 2024-06-02 NOTE — ED PROVIDER NOTES
Is this patient to be included in the SEP-1 Core Measure due to severe sepsis or septic shock?   No   Exclusion criteria - the patient is NOT to be included for SEP-1 Core Measure due to:      Chronic Conditions affecting care:    has a past medical history of Hepatitis C, IV drug user, MRSA (methicillin resistant staph aureus) culture positive, Postpartum depression, and Varicosities.    CONSULTS: (Who and What was discussed)  None        Records Reviewed (External and Source)     CC/HPI Summary, DDx, ED Course, and Reassessment:       Patient presented to the ER for evaluation of abscess to abdomen for the past few days has opened and is draining requesting antibiotics.  History of MRSA abscesses.  Vitals are within normal limits.  Afebrile.  No vomiting.  Not toxic or septic appearing and overall well-appearing here.  On exam she has a 2 cm abscess to the right lower abdomen that has a small open and draining purulent discharge there is induration at site with an area of surrounding erythema no red streaks no crepitus.  Discussed I&D with the area already opening and starting to drain discussed she would like to hold off on lidocaine injection and incision but agrees to allow me to open a little further with hemostats and expressing drainage this was done and purulent drainage was expressed.  Patient tolerated well.  Polysporin ointment 4 x 4 gauze bandage applied.  She was placed on Bactrim and Keflex.  She also requested a Diflucan states antibiotics typically cause a yeast infection and this was also prescribed.  Referral to primary care for wound check in 2 to 3 days and advise returning to the ER for any worsening symptoms.  She understands        I estimate there is LOW risk for SEVERE CELLULITIS, SEPSIS OR NECROTIZING FASCIITIS,  thus I consider the discharge disposition reasonable.      I am the Primary Clinician of Record.  FINAL IMPRESSION      1. Abscess          DISPOSITION/PLAN     DISPOSITION

## 2024-06-02 NOTE — DISCHARGE INSTRUCTIONS
Take Bactrim and Keflex antibiotics as prescribed.  Recommend warm compresses.  Have wound check in 2 to 3 days.  Return to the ER for any worsening.

## 2024-08-27 ENCOUNTER — HOSPITAL ENCOUNTER (OUTPATIENT)
Age: 39
Setting detail: OBSERVATION
LOS: 1 days | Discharge: HOME | End: 2024-08-29
Attending: EMERGENCY MEDICINE | Admitting: PSYCHIATRY & NEUROLOGY
Payer: COMMERCIAL

## 2024-08-27 DIAGNOSIS — Z76.89 ENCOUNTER FOR PSYCHIATRIC ASSESSMENT: Primary | ICD-10-CM

## 2024-08-27 PROCEDURE — 99285 EMERGENCY DEPT VISIT HI MDM: CPT

## 2024-08-27 ASSESSMENT — PAIN - FUNCTIONAL ASSESSMENT: PAIN_FUNCTIONAL_ASSESSMENT: NONE - DENIES PAIN

## 2024-08-28 PROBLEM — Z87.898 HISTORY OF INTRAVENOUS DRUG ABUSE: Status: ACTIVE | Noted: 2024-08-28

## 2024-08-28 PROBLEM — F39 MOOD DISORDER (HCC): Status: ACTIVE | Noted: 2024-08-28

## 2024-08-28 PROBLEM — F15.10 AMPHETAMINE ABUSE (HCC): Status: ACTIVE | Noted: 2024-08-28

## 2024-08-28 PROBLEM — Z72.0 TOBACCO ABUSE: Status: ACTIVE | Noted: 2024-08-28

## 2024-08-28 PROBLEM — B18.2 CHRONIC HEPATITIS C WITHOUT HEPATIC COMA (HCC): Status: ACTIVE | Noted: 2024-08-28

## 2024-08-28 LAB
ALBUMIN SERPL-MCNC: 3.8 G/DL (ref 3.4–5)
ALBUMIN/GLOB SERPL: 1.2 {RATIO} (ref 1.1–2.2)
ALP SERPL-CCNC: 65 U/L (ref 40–129)
ALT SERPL-CCNC: 49 U/L (ref 10–40)
AMPHETAMINES UR QL SCN>1000 NG/ML: POSITIVE
ANION GAP SERPL CALCULATED.3IONS-SCNC: 10 MMOL/L (ref 3–16)
APAP SERPL-MCNC: <5 UG/ML (ref 10–30)
AST SERPL-CCNC: 23 U/L (ref 15–37)
BARBITURATES UR QL SCN>200 NG/ML: ABNORMAL
BASOPHILS # BLD: 0.1 K/UL (ref 0–0.2)
BASOPHILS NFR BLD: 1.2 %
BENZODIAZ UR QL SCN>200 NG/ML: ABNORMAL
BILIRUB SERPL-MCNC: 0.4 MG/DL (ref 0–1)
BUN SERPL-MCNC: 22 MG/DL (ref 7–20)
CALCIUM SERPL-MCNC: 8.9 MG/DL (ref 8.3–10.6)
CANNABINOIDS UR QL SCN>50 NG/ML: POSITIVE
CHLORIDE SERPL-SCNC: 102 MMOL/L (ref 99–110)
CO2 SERPL-SCNC: 22 MMOL/L (ref 21–32)
COCAINE UR QL SCN: ABNORMAL
CREAT SERPL-MCNC: 0.7 MG/DL (ref 0.6–1.1)
DEPRECATED RDW RBC AUTO: 13.6 % (ref 12.4–15.4)
DRUG SCREEN COMMENT UR-IMP: ABNORMAL
EOSINOPHIL # BLD: 0.1 K/UL (ref 0–0.6)
EOSINOPHIL NFR BLD: 1.2 %
ETHANOLAMINE SERPL-MCNC: NORMAL MG/DL (ref 0–0.08)
FENTANYL SCREEN, URINE: ABNORMAL
GFR SERPLBLD CREATININE-BSD FMLA CKD-EPI: >90 ML/MIN/{1.73_M2}
GLUCOSE SERPL-MCNC: 87 MG/DL (ref 70–99)
HCG UR QL: NEGATIVE
HCT VFR BLD AUTO: 34.1 % (ref 36–48)
HGB BLD-MCNC: 11.5 G/DL (ref 12–16)
LYMPHOCYTES # BLD: 2.1 K/UL (ref 1–5.1)
LYMPHOCYTES NFR BLD: 22.7 %
MCH RBC QN AUTO: 29.2 PG (ref 26–34)
MCHC RBC AUTO-ENTMCNC: 33.8 G/DL (ref 31–36)
MCV RBC AUTO: 86.3 FL (ref 80–100)
METHADONE UR QL SCN>300 NG/ML: ABNORMAL
MONOCYTES # BLD: 0.5 K/UL (ref 0–1.3)
MONOCYTES NFR BLD: 5.8 %
NEUTROPHILS # BLD: 6.3 K/UL (ref 1.7–7.7)
NEUTROPHILS NFR BLD: 69.1 %
OPIATES UR QL SCN>300 NG/ML: ABNORMAL
OXYCODONE UR QL SCN: ABNORMAL
PCP UR QL SCN>25 NG/ML: ABNORMAL
PH UR STRIP: 6 [PH]
PLATELET # BLD AUTO: 275 K/UL (ref 135–450)
PMV BLD AUTO: 7.5 FL (ref 5–10.5)
POTASSIUM SERPL-SCNC: 3.7 MMOL/L (ref 3.5–5.1)
PROT SERPL-MCNC: 7 G/DL (ref 6.4–8.2)
RBC # BLD AUTO: 3.96 M/UL (ref 4–5.2)
SALICYLATES SERPL-MCNC: <0.3 MG/DL (ref 15–30)
SODIUM SERPL-SCNC: 134 MMOL/L (ref 136–145)
TSH SERPL DL<=0.005 MIU/L-ACNC: 1.54 UIU/ML (ref 0.27–4.2)
WBC # BLD AUTO: 9.2 K/UL (ref 4–11)

## 2024-08-28 PROCEDURE — 90792 PSYCH DIAG EVAL W/MED SRVCS: CPT | Performed by: NURSE PRACTITIONER

## 2024-08-28 PROCEDURE — 6370000000 HC RX 637 (ALT 250 FOR IP): Performed by: EMERGENCY MEDICINE

## 2024-08-28 PROCEDURE — 80307 DRUG TEST PRSMV CHEM ANLYZR: CPT

## 2024-08-28 PROCEDURE — 80143 DRUG ASSAY ACETAMINOPHEN: CPT

## 2024-08-28 PROCEDURE — 1240000000 HC EMOTIONAL WELLNESS R&B

## 2024-08-28 PROCEDURE — 82077 ASSAY SPEC XCP UR&BREATH IA: CPT

## 2024-08-28 PROCEDURE — 99285 EMERGENCY DEPT VISIT HI MDM: CPT

## 2024-08-28 PROCEDURE — 84443 ASSAY THYROID STIM HORMONE: CPT

## 2024-08-28 PROCEDURE — 85025 COMPLETE CBC W/AUTO DIFF WBC: CPT

## 2024-08-28 PROCEDURE — 99222 1ST HOSP IP/OBS MODERATE 55: CPT

## 2024-08-28 PROCEDURE — 80053 COMPREHEN METABOLIC PANEL: CPT

## 2024-08-28 PROCEDURE — 84703 CHORIONIC GONADOTROPIN ASSAY: CPT

## 2024-08-28 PROCEDURE — 6370000000 HC RX 637 (ALT 250 FOR IP): Performed by: NURSE PRACTITIONER

## 2024-08-28 PROCEDURE — 80179 DRUG ASSAY SALICYLATE: CPT

## 2024-08-28 RX ORDER — ACETAMINOPHEN 325 MG/1
650 TABLET ORAL EVERY 4 HOURS PRN
Status: DISCONTINUED | OUTPATIENT
Start: 2024-08-28 | End: 2024-08-29 | Stop reason: HOSPADM

## 2024-08-28 RX ORDER — BENZTROPINE MESYLATE 1 MG/ML
2 INJECTION, SOLUTION INTRAMUSCULAR; INTRAVENOUS 2 TIMES DAILY PRN
Status: DISCONTINUED | OUTPATIENT
Start: 2024-08-28 | End: 2024-08-29 | Stop reason: HOSPADM

## 2024-08-28 RX ORDER — NICOTINE 21 MG/24HR
1 PATCH, TRANSDERMAL 24 HOURS TRANSDERMAL DAILY
Status: DISCONTINUED | OUTPATIENT
Start: 2024-08-28 | End: 2024-08-29 | Stop reason: HOSPADM

## 2024-08-28 RX ORDER — IBUPROFEN 400 MG/1
400 TABLET, FILM COATED ORAL EVERY 6 HOURS PRN
Status: DISCONTINUED | OUTPATIENT
Start: 2024-08-28 | End: 2024-08-29 | Stop reason: HOSPADM

## 2024-08-28 RX ORDER — OLANZAPINE 10 MG/1
10 TABLET ORAL EVERY 4 HOURS PRN
Status: DISCONTINUED | OUTPATIENT
Start: 2024-08-28 | End: 2024-08-29 | Stop reason: HOSPADM

## 2024-08-28 RX ORDER — POLYETHYLENE GLYCOL 3350 17 G
2 POWDER IN PACKET (EA) ORAL
Status: DISCONTINUED | OUTPATIENT
Start: 2024-08-28 | End: 2024-08-29 | Stop reason: HOSPADM

## 2024-08-28 RX ORDER — LORAZEPAM 2 MG/1
2 TABLET ORAL ONCE
Status: COMPLETED | OUTPATIENT
Start: 2024-08-28 | End: 2024-08-28

## 2024-08-28 RX ORDER — MAGNESIUM HYDROXIDE/ALUMINUM HYDROXICE/SIMETHICONE 120; 1200; 1200 MG/30ML; MG/30ML; MG/30ML
30 SUSPENSION ORAL EVERY 6 HOURS PRN
Status: DISCONTINUED | OUTPATIENT
Start: 2024-08-28 | End: 2024-08-29 | Stop reason: HOSPADM

## 2024-08-28 RX ORDER — HYDROXYZINE HYDROCHLORIDE 50 MG/1
50 TABLET, FILM COATED ORAL 3 TIMES DAILY PRN
Status: DISCONTINUED | OUTPATIENT
Start: 2024-08-28 | End: 2024-08-29 | Stop reason: HOSPADM

## 2024-08-28 RX ORDER — TRAZODONE HYDROCHLORIDE 50 MG/1
50 TABLET, FILM COATED ORAL NIGHTLY PRN
Status: DISCONTINUED | OUTPATIENT
Start: 2024-08-28 | End: 2024-08-29 | Stop reason: HOSPADM

## 2024-08-28 RX ADMIN — IBUPROFEN 400 MG: 400 TABLET, FILM COATED ORAL at 18:06

## 2024-08-28 RX ADMIN — LORAZEPAM 2 MG: 2 TABLET ORAL at 08:21

## 2024-08-28 ASSESSMENT — PATIENT HEALTH QUESTIONNAIRE - PHQ9
SUM OF ALL RESPONSES TO PHQ9 QUESTIONS 1 & 2: 0
SUM OF ALL RESPONSES TO PHQ QUESTIONS 1-9: 0
1. LITTLE INTEREST OR PLEASURE IN DOING THINGS: NOT AT ALL
SUM OF ALL RESPONSES TO PHQ QUESTIONS 1-9: 0
SUM OF ALL RESPONSES TO PHQ QUESTIONS 1-9: 0
SUM OF ALL RESPONSES TO PHQ9 QUESTIONS 1 & 2: 0
1. LITTLE INTEREST OR PLEASURE IN DOING THINGS: NOT AT ALL
2. FEELING DOWN, DEPRESSED OR HOPELESS: NOT AT ALL
SUM OF ALL RESPONSES TO PHQ QUESTIONS 1-9: 0
SUM OF ALL RESPONSES TO PHQ QUESTIONS 1-9: 0
2. FEELING DOWN, DEPRESSED OR HOPELESS: NOT AT ALL
SUM OF ALL RESPONSES TO PHQ QUESTIONS 1-9: 0

## 2024-08-28 ASSESSMENT — PAIN DESCRIPTION - DESCRIPTORS: DESCRIPTORS: ACHING

## 2024-08-28 ASSESSMENT — SLEEP AND FATIGUE QUESTIONNAIRES
DO YOU USE A SLEEP AID: NO
DO YOU HAVE DIFFICULTY SLEEPING: NO
DO YOU HAVE DIFFICULTY SLEEPING: NO
AVERAGE NUMBER OF SLEEP HOURS: 8
AVERAGE NUMBER OF SLEEP HOURS: 8
DO YOU USE A SLEEP AID: NO

## 2024-08-28 ASSESSMENT — LIFESTYLE VARIABLES
HOW OFTEN DO YOU HAVE A DRINK CONTAINING ALCOHOL: NEVER
HOW MANY STANDARD DRINKS CONTAINING ALCOHOL DO YOU HAVE ON A TYPICAL DAY: PATIENT DOES NOT DRINK

## 2024-08-28 ASSESSMENT — PAIN SCALES - GENERAL: PAINLEVEL_OUTOF10: 6

## 2024-08-28 ASSESSMENT — PAIN DESCRIPTION - LOCATION: LOCATION: HEAD

## 2024-08-28 ASSESSMENT — PAIN - FUNCTIONAL ASSESSMENT: PAIN_FUNCTIONAL_ASSESSMENT: ACTIVITIES ARE NOT PREVENTED

## 2024-08-28 NOTE — ED NOTES
0719-Patient seen and evaluated at bedside by Silvio Wright NP with Tele-psych at bedside. Spoke with Dr. Judge who is recommending inpatient psychiatric admission.   0719-ADT20 orders placed by Dr. Judge to [facility] to begin admission for patient at inpatient psychiatric care.
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
## 2024-08-28 NOTE — VIRTUAL HEALTH
Irma Hwang, was evaluated through a synchronous (real-time) audio-video encounter. The patient (and/or guardian if applicable) is aware that this is a billable service, which includes applicable co-pays. This virtual visit was conducted with patient's (and/or legal guardian's) consent. Patient identification was verified, and a caregiver was present when appropriate.  The patient was located at Facility (Appt Department): [facility] ED  [address]  [facility] [address]  Loc: [phone]  The provider was located at Home (City/State): [place]  Confirm you are appropriately licensed, registered, or certified to deliver care in the state where the patient is located as indicated above. If you are not or unsure, please re-schedule the visit: Yes, I confirm.   [place] Consult to Tele-Psych  Consult performed by: Silvio Wright, VANIA - CNP  Consult ordered by: Thomas Judge MD Christin M Rogers  [id number]  1985       EMERGENCY DEPARTMENT TELEPSYCHIATRY REASSESSMENT    08/28/24    History  From: Chart Review, Patient's mother, Patient    Chief Complaint:  “My sister called the  on me and said I was suicidal. ”      Per Record:  Patient is a 39 y.o.  female who presents for psychiatric evaluation.  Patient presented to the ED on 08/28/24 via police after her sister reported her being suicidal.  Patient denies SI.  Several attempts were made to contact Althea; however, she never answered the phone.  Patient does admit to some depression.  Patient has several suicide risk factors.  Patient has no outpatient psychiatric resources and limited support system.  Additionally, patient reports not having used methamphetamines for \"a month or 2;\" however, her UDS resulted in the patient being positive for methamphetamines and marijuana.  Unable to establish safe discharge plan at this time.  Recommend reconsulting in the AM when collateral information

## 2024-08-28 NOTE — VIRTUAL HEALTH
Irma Hwang, was evaluated through a synchronous (real-time) audio-video encounter. The patient (and/or guardian if applicable) is aware that this is a billable service, which includes applicable co-pays. This virtual visit was conducted with patient's (and/or legal guardian's) consent. Patient identification was verified, and a caregiver was present when appropriate.  The patient was located at Facility (Appt Department): [facility] ED  [address]  [facility] [address]  Loc: [phone]  The provider was located at Home (City/State): [place]  Confirm you are appropriately licensed, registered, or certified to deliver care in the state where the patient is located as indicated above. If you are not or unsure, please re-schedule the visit: Yes, I confirm.   [place] Consult to Tele-Psych  Consult performed by: Silvio Wright, VANIA - CNP  Consult ordered by: Thomas Judge MD Christin HARVINDER Hwang  [id number]  1985     EMERGENCY DEPARTMENT TELEPSYCHIATRY EVALUATION    08/28/24    Chief Complaint:  “My sister called the  on me and said I was suicidal.”    Per Record:  Patient states her family called and told 911 that she was suicidal, patient denies that she is she was walking down to her friends house when police picked her up. Patient denies wanting to harm herself.         HPI:   Patient is a 39 y.o.  female who presents for psychiatric evaluation. Patient presented to the ED on 08/28/24 via police after her sister reported her being suicidal.  Patient is agreeable to the interview.  She is alert and oriented x 4, and polite.  Patient's speech was rapid but not pressured.  Patient reports depression and rates her current depression as a \"4\" on a 0-10 scale with 10 being the worst.  Patient denies anxiety.  She denies suicidal and homicidal ideation.  She denies AH and VH.  Patient reports, \"today was my dad's birthday but he has passed  though not formally tested.  Insight: fair   Judgement: fair   Fund of Knowledge: adequate      Risk Assessment:  Protective Factors:  Protective: Age >25 and <55, Female gender, Denies suicidal ideation, Does not have lethal plan, Patient is verbally mary for safety, No prior suicide attempts, No active psychosis or cognitive dysfunction, and Patient is future oriented   Risk Factors:  Risk Factors: Depressed mood, Active substance abuse, Highly impulsive behaviors, Recent significant loss, Chronic pain or medical illness, Social isolation, No outpatient services in place, and No collateral information to support safety    C-SSRS Score       8/27/2024    10:26 PM   C-SSRS Suicide Screening   1) Within the past month, have you wished you were dead or wished you could go to sleep and not wake up?  No   2) Have you actually had any thoughts of killing yourself?  No   6) Have you ever done anything, started to do anything, or prepared to do anything to end your life? No    :      Overall Level Suicide Risk: TelePsych CSSRS Risk Level: Moderate Risk    Assessment:   Patient is a 39 y.o.  female who presents for psychiatric evaluation.  Patient presented to the ED on 08/28/24 via police after her sister reported her being suicidal.  Patient denies SI.  Several attempts were made to contact Althea; however, she never answered the phone.  Patient does admit to some depression.  Patient has several suicide risk factors.  Patient has no outpatient psychiatric resources and limited support system.  Additionally, patient reports not having used methamphetamines for \"a month or 2;\" however, her UDS resulted in the patient being positive for methamphetamines and marijuana.  Unable to establish safe discharge plan at this time.  Recommend reconsulting in the AM when collateral information is more likely to be obtained.    Dx:   Depression  R/O suicidal ideation    Plan:  Recommendation for final disposition is

## 2024-08-28 NOTE — ED TRIAGE NOTES
Patient states her family called and told 911 that she was suicidal, patient denies that she is she was walking down to her friends house when police picked her up. Patient denies wanting to harm herself.

## 2024-08-28 NOTE — H&P
Hospital Medicine History & Physical      PCP: No primary care provider on file.    Date of Admission: 2024    Date of Service: Pt seen/examined on 24     Chief Complaint:    Chief Complaint   Patient presents with    Psychiatric Evaluation     Patient states her family called and told 911 that she was suicidal, patient denies that she is she was walking down to her friends house when police picked her up. Patient denies wanting to harm herself.          History Of Present Illness:      The patient is a 39 y.o. female with PMH of HepC, IVDU, postpartum depression, and anemia who presented to [facility] ED for suicidal ideation.  Patient was seen and evaluated in the ED by the ED medical provider, patient was medically cleared for admission to [facility].  This note serves as an admission medical H&P.    Tobacco use: yes  ETOH use: denies  Illicit drug use: denies    Patient denies any medical complaints.     Past Medical History:        Diagnosis Date    Hepatitis C     IV drug user     MRSA (methicillin resistant staph aureus) culture positive     Postpartum depression     Bipolar after first child    Varicosities     bilateral legs varicosities       Past Surgical History:        Procedure Laterality Date    CARPAL TUNNEL RELEASE  209    left     SECTION      CHOLECYSTECTOMY      GALLBLADDER SURGERY      ULNAR TUNNEL RELEASE  2008    reconstruction       Medications Prior to Admission:    Prior to Admission medications    Medication Sig Start Date End Date Taking? Authorizing Provider   ibuprofen (ADVIL;MOTRIN) 200 MG tablet Take 1 tablet by mouth every 6 hours as needed for Pain  Patient not taking: Reported on 2024    Provider, MD Amanda   ondansetron (ZOFRAN-ODT) 4 MG disintegrating tablet Take 1 tablet by mouth every 8 hours as needed for Nausea or Vomiting  Patient not taking: Reported on 2024   Jessica Jolly PA-C       Allergies:  Patient has no known
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
## 2024-08-28 NOTE — PLAN OF CARE
Problem: Depression/Self Harm  Goal: Effect of psychiatric condition will be minimized and patient will be protected from self harm  Description: INTERVENTIONS:  1. Assess impact of patient's symptoms on level of functioning, self care needs and offer support as indicated  2. Assess patient/family knowledge of depression, impact on illness and need for teaching  3. Provide emotional support, presence and reassurance  4. Assess for possible suicidal thoughts or ideation. If patient expresses suicidal thoughts or statements do not leave alone, initiate Suicide Precautions, move to a room close to the nursing station and obtain sitter  5. Initiate consults as appropriate with Mental Health Professional, Spiritual Care, Psychosocial CNS, and consider a recommendation to the LIP for a Psychiatric Consultation  Outcome: Progressing     Problem: Involuntary Admit  Goal: Will cooperate with staff recommendations and doctor's orders and will demonstrate appropriate behavior  Description: INTERVENTIONS:  1. Treat underlying conditions and offer medication as ordered  2. Educate regarding involuntary admission procedures and rules  3. Contain excessive/inappropriate behavior per unit and hospital policies  Outcome: Not Progressing   Patient denies SI/HI, AVH and pain. Patient calm and cooperative. Patient voiced no concerns at this time.

## 2024-08-28 NOTE — CARE COORDINATION
08/28/24 1538   Leisure Activity 1   3 Favorite Leisure Activities listend to music   Frequency <2 hours/day   Last time this week   Leisure Activity 2   Favorite Leisure Activities  walk every day   Last time  this week   Leisure Activity 3   Favorite Leisure Activities  take long baths   Frequency  > 2 hours/day   Last time  this week   Social   Patient reports spending the majority of their free time with one other person   Patient verbalizes a preference for spending free time with one other person   Patient’s perception of support system negative/weak   Patient’s perception of barriers to socializing with others include(s) transportation   Beliefs & Coping   Has difficulty dealing with feelings   No   Internalizes feelings/Keeps feelings in No   Externalizes feelings through aggressiveness or poor temper control  No   Feels uncomfortable around others  No   Has difficulty talking to others  No   Depends on others for direction or decisions No   Difficulty dealing with anger of others  No   Difficulty dealing with own anger  Yes   Difficulty managing stress No   Frequently has difficulty with relationships  No   Has recently perceived/experienced loss, disappointment, humiliation or failure  Yes   General perception about self likes self   Attitude about abilities more successful than not   Locus of Control  always   Belief about recovery Recovery is possible   Patient Identified Strengths  determination   Patient Identified Limitations  relapsed on drugs   Perception of most stressful event prior to hospitalization my fathers death   Perception of changes needed drug treatment   Strengths and Limitations   Strengths Educated   Limitations Tendency to isolate self

## 2024-08-28 NOTE — BH NOTE
Primary RN Angy reported to writer that pt reports a recent history of MRSA. Pt moved to private room and contact isolation initiated per protocol.

## 2024-08-28 NOTE — BH NOTE
Patient arrived on unit after completing security check. Patient belongings secured behind nurses station. Patient calm and cooperative. Patient offered snack and fluids. Patient in safety gown.

## 2024-08-28 NOTE — TRANSFER CENTER NOTE
Transfer Center Handoff for Behavioral Health Transfers      Patient's Current Location: [facility] ED     Chief Complaint   Patient presents with    Psychiatric Evaluation     Patient states her family called and told 911 that she was suicidal, patient denies that she is she was walking down to her friends house when police picked her up. Patient denies wanting to harm herself.        Current or History of Violent Behavior: No    Currently in Restraints Now or During this Encounter: No  (Specify if Agitation or self harm is noted in ED?)  If yes, please describe behaviors requiring restraint:             Medical Clearance Documented and Verified in the Chart: Yes    No Known Allergies     Can Patient Tolerate Lying Flat: Yes    Able to Perform ADLs:  Yes  (Specify if able to ambulate or uses any mobility devices such as cane or walker)  Activity:    Level of Assistance:    Assistive Device:    Miscellaneous Devices:      LABS    CBC:   Lab Results   Component Value Date/Time    WBC 9.2 08/28/2024 12:05 AM    RBC 3.96 08/28/2024 12:05 AM    HGB 11.5 08/28/2024 12:05 AM    HCT 34.1 08/28/2024 12:05 AM    MCV 86.3 08/28/2024 12:05 AM    MCH 29.2 08/28/2024 12:05 AM    MCHC 33.8 08/28/2024 12:05 AM    RDW 13.6 08/28/2024 12:05 AM     08/28/2024 12:05 AM    MPV 7.5 08/28/2024 12:05 AM     CMP:   Lab Results   Component Value Date/Time     08/28/2024 12:05 AM    K 3.7 08/28/2024 12:05 AM     08/28/2024 12:05 AM    CO2 22 08/28/2024 12:05 AM    BUN 22 08/28/2024 12:05 AM    CREATININE 0.7 08/28/2024 12:05 AM    GFRAA >60 01/02/2019 01:25 PM    GFRAA >60 04/09/2013 05:10 PM    AGRATIO 1.2 08/28/2024 12:05 AM    LABGLOM >90 08/28/2024 12:05 AM    GLUCOSE 87 08/28/2024 12:05 AM    CALCIUM 8.9 08/28/2024 12:05 AM    BILITOT 0.4 08/28/2024 12:05 AM    ALKPHOS 65 08/28/2024 12:05 AM    AST 23 08/28/2024 12:05 AM    ALT 49 08/28/2024 12:05 AM     Drug Panel: No results found for: \"AMPHETAMUR\",  \"BARBITURATUR\", \"COCAINEUR\", \"METHADU\", \"OPIAU\", \"THCUR\", \"LABCOMM\"  UA:  Lab Results   Component Value Date/Time    COLORU Yellow 07/25/2022 11:45 AM    PROTEINU Negative 07/25/2022 11:45 AM    GLUCOSEU Negative 07/25/2022 11:45 AM    GLUCOSEU NEGATIVE 05/14/2011 06:37 PM    KETUA Negative 07/25/2022 11:45 AM    BILIRUBINUR Negative 07/25/2022 11:45 AM    BILIRUBINUR neg 01/18/2013 06:41 PM    BILIRUBINUR NEGATIVE 05/14/2011 06:37 PM    BLOODU Negative 07/25/2022 11:45 AM    UROBILINOGEN 0.2 07/25/2022 11:45 AM    NITRU Negative 07/25/2022 11:45 AM    LEUKOCYTESUR Negative 07/25/2022 11:45 AM    WBCUA 3-5 07/15/2015 04:30 PM    RBCUA 20-50 07/15/2015 04:30 PM    BACTERIA 1+ 07/15/2015 04:30 PM     PREGNANCY TEST:   Lab Results   Component Value Date/Time    PREGTESTUR Negative 08/28/2024 12:05 AM    PREGTESTUR Negative 10/04/2018 11:16 AM       Dialysis: No    Target Destination: Adult Behavioral Health    Insurance: Payor: [facility] /  /  /      Current Psychotic Symptoms  Harmful Actions Towards Others:    Orientation Level:    Speech Pattern:    General Attitude:    Emotions:    Delusions:    Hallucination:       Any Suicidal Ideations: No Risk    Homicidal Ideations/Violence Risk:   Observed Violent Behavior:    Homicidal Ideations:      Past Psychiatric History:       Diagnosis Date    Hepatitis C     IV drug user     MRSA (methicillin resistant staph aureus) culture positive     Postpartum depression     Bipolar after first child    Varicosities     bilateral legs varicosities       Hold (TDO/Involuntary Hold): Yes    The patient is not currently receiving care for the above psychiatric illness.     Home Medications  Does Patient Have Medications to Bring to the Facility: No  Medications Prior to Admission:   Prior to Admission Medications   Prescriptions Last Dose Informant Patient Reported? Taking?   ibuprofen (ADVIL;MOTRIN) 200 MG tablet Not Taking  Yes No   Sig: Take 1 tablet by mouth every 6 hours as

## 2024-08-28 NOTE — BH NOTE
[facility]  Admission Note     Admission Type:   Admission Type: Involuntary    Reason for admission:  Reason for Admission: Depression/SI      Addictive Behavior:   Addictive Behavior  In the Past 3 Months, Have You Felt or Has Someone Told You That You Have a Problem With  : None    Medical Problems:   Past Medical History:   Diagnosis Date    Hepatitis C     IV drug user     MRSA (methicillin resistant staph aureus) culture positive     Postpartum depression     Bipolar after first child    Varicosities     bilateral legs varicosities       Status EXAM:  Mental Status and Behavioral Exam  Normal: No  Level of Assistance: Independent/Self  Facial Expression: Flat  Affect: Congruent  Level of Consciousness: Alert  Frequency of Checks: 4 times per hour, close  Mood:Normal: No  Mood: Sad  Motor Activity:Normal: Yes  Eye Contact: Good  Observed Behavior: Cooperative  Sexual Misconduct History: Current - no  Preception: [place] to person, [place] to time, [place] to place, [place] to situation  Attention:Normal: Yes  Thought Processes: Circumstantial  Thought Content:Normal: No  Thought Content: Poverty of content  Depression Symptoms: Isolative  Anxiety Symptoms: Generalized  Deidre Symptoms: No problems reported or observed.  Hallucinations: None (Patient denies)  Delusions: No  Memory:Normal: Yes  Insight and Judgment: No  Insight and Judgment: Poor judgment, Poor insight    Tobacco Screening:  Practical Counseling, on admission, ingris X, if applicable and completed (first 3 are required if patient doesn't refuse):            ( ) Recognizing danger situations (included triggers and roadblocks)                    ( ) Coping skills (new ways to manage stress,relaxation techniques, changing routine, distraction)                                                           ( ) Basic information about quitting (benefits of quitting, techniques in how to quit, available resources  ( ) [facility]                                                                                                                   ( x) Patient refused counseling  ( ) Patient has not smoked in the last 30 days    Metabolic Screening:    No results found for: \"LABA1C\"    No results found for: \"CHOL\"  No results found for: \"TRIG\"  No results found for: \"HDL\"  No components found for: \"LDLCAL\"  No components found for: \"LABVLDL\"      Body mass index is 30.27 kg/m².    BP Readings from Last 2 Encounters:   08/28/24 108/69   06/02/24 127/83           Pt admitted with followings belongings:  Dental Appliances: None  Vision - Corrective Lenses: None  Hearing Aid: None  Jewelry: Earrings, Ring  Body Piercings Removed: N/A  Clothing: Footwear, Undergarments, Shirt, Pants  Other Valuables: Other (Comment) (sunglasses)  The following personal items were collected during admission. Items secured in locker/safe. Items will be returned to patient at discharge.     Francisca Church RN

## 2024-08-28 NOTE — ED NOTES
0838-Call received from Jovany [facility] stated that Liliana Dye NP at [facility] requesting patient to be reevaluated by Tele-psych. Notified Jovany that Silvio Shore NP with Tele-psych did reevaluate patient this morning just waiting for him to put his note in from this AM. Jovany stated she will wait for the note to be put in the chart.   0857-Call back received from Silvio Wright NP. Stated that his note is in the chart.  0859-Call placed to [facility] notified them that Tele-psych NP note is in the chart.

## 2024-08-28 NOTE — ED PROVIDER NOTES
health hold.  Reportedly family had reported the patient was going to harm herself.  Denies thoughts of self-harm or harm to others.  Initially telepsych consulted.  They did recommend obtaining laboratory workup.     Patient's laboratory does not show any emergent hematologic or metabolic abnormalities.  Toxicology screen positive for methamphetamines and cannabinoids.  Patient is medically cleared for mental health evaluation.     I did speak with on-call telepsych mental health professional who recommended observation of the patient until the morning until they can obtain collateral information from patient's family.  Telepsych mental health depression was able to obtain additional collateral from patient's family and recommended admission for further mental health evaluation and treatment.     Patient will be signed out to the oncoming provider pending telepsych reevaluation.  Please see their note for full ED course and final disposition.     CONSULTS: (Who and What was discussed)  IP CONSULT TO TELE-PSYCH (SOCIAL WORK ONLY)  IP CONSULT TO TELE-PSYCH (SOCIAL WORK ONLY)      Social Determinants Significantly Affecting Health : Patient lacks transportation    Chronic Conditions:   Past Medical History:   Diagnosis Date    Hepatitis C     IV drug user     MRSA (methicillin resistant staph aureus) culture positive     Postpartum depression     Bipolar after first child    Varicosities     bilateral legs varicosities         Records Reviewed (External and source): Reviewed patient's previous ED visits for abscess.     Disposition Considerations (include 1 Tests not done, Admit vs D/C, Shared Decision Making, Pt Expectation of Test or Tx.): Patient signed oncoming provider pending telepsych recommendations and reevaluation.     Please see their note for further ED course and final disposition .      I am the Primary Clinician of Record.    FINAL IMPRESSION      1. Encounter for psychiatric assessment           DISPOSITION/PLAN     DISPOSITION    Condition at Disposition: Data Unavailable      PATIENT REFERRED TO:  No follow-up provider specified.    DISCHARGE MEDICATIONS:  New Prescriptions    No medications on file       DISCONTINUED MEDICATIONS:  Discontinued Medications    No medications on file              (Please note that portions of this note were completed with a voice recognition program.  Efforts were made to edit the dictations but occasionally words are mis-transcribed.)    Thomas Judge MD (electronically signed)            Thomas Judge MD  08/30/24 [id number]

## 2024-08-28 NOTE — VIRTUAL HEALTH
Irma Hwang, was evaluated through a synchronous (real-time) audio-video encounter. The patient (and/or guardian if applicable) is aware that this is a billable service, which includes applicable co-pays. This virtual visit was conducted with patient's (and/or legal guardian's) consent. Patient identification was verified, and a caregiver was present when appropriate.  The patient was located at Facility (Appt Department): [facility] ED  [address] [id number]  Loc: [phone]  The provider was located at Home (City/State): [place]  Confirm you are appropriately licensed, registered, or certified to deliver care in the state where the patient is located as indicated above. If you are not or unsure, please re-schedule the visit: Yes, I confirm.   Consults     I spoke with Dr. EVERT Judge about obtaining labs on patient.  TelePsych will meet with patient after labs have been obtained.          Total time spent on this encounter: Not billed by time    --VANIA Courtney - CNP on 8/27/2024 at 11:36 PM    An electronic signature was used to authenticate this note.

## 2024-08-29 VITALS
BODY MASS INDEX: 30.43 KG/M2 | TEMPERATURE: 97.2 F | RESPIRATION RATE: 16 BRPM | OXYGEN SATURATION: 100 % | DIASTOLIC BLOOD PRESSURE: 75 MMHG | WEIGHT: 155 LBS | HEIGHT: 60 IN | SYSTOLIC BLOOD PRESSURE: 120 MMHG | HEART RATE: 92 BPM

## 2024-08-29 LAB
CHOLEST SERPL-MCNC: 161 MG/DL (ref 0–199)
EKG ATRIAL RATE: 93 BPM
EKG DIAGNOSIS: NORMAL
EKG P AXIS: 58 DEGREES
EKG P-R INTERVAL: 118 MS
EKG Q-T INTERVAL: 352 MS
EKG QRS DURATION: 86 MS
EKG QTC CALCULATION (BAZETT): 437 MS
EKG R AXIS: 88 DEGREES
EKG T AXIS: 50 DEGREES
EKG VENTRICULAR RATE: 93 BPM
EST. AVERAGE GLUCOSE BLD GHB EST-MCNC: 93.9 MG/DL
HBA1C MFR BLD: 4.9 %
HDLC SERPL-MCNC: 90 MG/DL (ref 40–60)
LDLC SERPL CALC-MCNC: 62 MG/DL
TRIGL SERPL-MCNC: 43 MG/DL (ref 0–150)
VLDLC SERPL CALC-MCNC: 9 MG/DL

## 2024-08-29 PROCEDURE — 93010 ELECTROCARDIOGRAM REPORT: CPT | Performed by: INTERNAL MEDICINE

## 2024-08-29 PROCEDURE — 80061 LIPID PANEL: CPT

## 2024-08-29 PROCEDURE — 93005 ELECTROCARDIOGRAM TRACING: CPT | Performed by: NURSE PRACTITIONER

## 2024-08-29 PROCEDURE — 83036 HEMOGLOBIN GLYCOSYLATED A1C: CPT

## 2024-08-29 PROCEDURE — G0378 HOSPITAL OBSERVATION PER HR: HCPCS

## 2024-08-29 PROCEDURE — 36415 COLL VENOUS BLD VENIPUNCTURE: CPT

## 2024-08-29 PROCEDURE — 6370000000 HC RX 637 (ALT 250 FOR IP): Performed by: NURSE PRACTITIONER

## 2024-08-29 PROCEDURE — 5130000000 HC BRIDGE APPOINTMENT

## 2024-08-29 PROCEDURE — 99223 1ST HOSP IP/OBS HIGH 75: CPT

## 2024-08-29 RX ADMIN — NICOTINE POLACRILEX 2 MG: 2 LOZENGE ORAL at 09:50

## 2024-08-29 RX ADMIN — HYDROXYZINE HYDROCHLORIDE 50 MG: 50 TABLET, FILM COATED ORAL at 08:57

## 2024-08-29 RX ADMIN — NICOTINE POLACRILEX 2 MG: 2 LOZENGE ORAL at 13:04

## 2024-08-29 NOTE — PLAN OF CARE
Problem: Risk for Elopement  Goal: Patient will not exit the unit/facility without proper excort  8/29/2024 1028 by Skye Cummings LPN  Outcome: Progressing     Problem: Anxiety  Goal: Will report anxiety at manageable levels  Description: INTERVENTIONS:  1. Administer medication as ordered  2. Teach and rehearse alternative coping skills  3. Provide emotional support with 1:1 interaction with staff  8/29/2024 1028 by Skye Cummings LPN  Outcome: Progressing  Flowsheets (Taken 8/29/2024 0945)  Will report anxiety at manageable levels:   Administer medication as ordered   Provide emotional support with 1:1 interaction with staff   Teach and rehearse alternative coping skills     Problem: Depression/Self Harm  Goal: Effect of psychiatric condition will be minimized and patient will be protected from self harm  Description: INTERVENTIONS:  1. Assess impact of patient's symptoms on level of functioning, self care needs and offer support as indicated  2. Assess patient/family knowledge of depression, impact on illness and need for teaching  3. Provide emotional support, presence and reassurance  4. Assess for possible suicidal thoughts or ideation. If patient expresses suicidal thoughts or statements do not leave alone, initiate Suicide Precautions, move to a room close to the nursing station and obtain sitter  5. Initiate consults as appropriate with Mental Health Professional, Spiritual Care, Psychosocial CNS, and consider a recommendation to the LIP for a Psychiatric Consultation  8/29/2024 1028 by Skye Cummings LPN  Outcome: Progressing     Problem: Involuntary Admit  Goal: Will cooperate with staff recommendations and doctor's orders and will demonstrate appropriate behavior  Description: INTERVENTIONS:  1. Treat underlying conditions and offer medication as ordered  2. Educate regarding involuntary admission procedures and rules  3. Contain excessive/inappropriate behavior per unit and hospital  policies  8/29/2024 1028 by Skye Cummings, LPN  Outcome: Progressing

## 2024-08-29 NOTE — GROUP NOTE
Group Therapy Note    Date: 8/29/2024    Group Start Time: 1100  Group End Time: 1145  Group Topic: Psychoeducation    [facility]    Vicky Sue LISW        Group Therapy Note    Attendees: 6       Patient's Goal:  to learn and discuss the communication styles of being assertive, passive and aggressive. Discussed that communicating in an assertive manner is the healthiest way to communicate. Pt's asked to apply to their lives.      Notes:  pt attended group for the full duration. She actively participated in group discussion and was able to apply to herself.    Status After Intervention:  Improved    Participation Level: Active Listener and Interactive    Participation Quality: Appropriate, Attentive, and Sharing      Speech:  normal      Thought Process/Content: Logical      Affective Functioning: Congruent      Mood: anxious      Level of consciousness:  Alert, Oriented x4, and Attentive      Response to Learning: Able to verbalize current knowledge/experience, Able to verbalize/acknowledge new learning, and Progressing to goal      Endings: None Reported    Modes of Intervention: Education, Support, Socialization, Exploration, and Clarifying      Discipline Responsible: /Counselor      Signature:  SAMANTA Curry

## 2024-08-29 NOTE — BH NOTE
Requested antianxiety medication. Received Atarax 50 mg PO to aid in decreasing anxiety. Attending morning group.

## 2024-08-29 NOTE — H&P
INITIAL PSYCHIATRIC HISTORY AND PHYSICAL      Patient name: Irma Hwang  Admit date: 8/27/2024  Today's date: 8/29/2024           CC:  Mood Disorder, substance use    HPI:   Patient seen in room on Adult Behavioral Unit.   Patient is a 39 y.o. female who presents  to [facility] for evaluation after her sister called police stating she was suicidal.  Per tele-psych notes:  \" Patient reports, \"today was my dad's birthday but he has passed away.  We went to the [facility] site and when we got back, me and my sister got into an argument.  I did not want to get into an argument so I walked away.  She is 9 years younger than me and has control issues so she called the  about me being suicidal.  I did not say anything about that.  We all go through the grieving process differently.  I dealt with mine and cried.  He was old and sick.\"  Patient reports that she has upcoming court date for child support and an upcoming job interview.  She reports that she lives with her mom and they \"take care of each other.\"  Patient became uncomfortable/guarded when on the topic of substance use.  Patient reports that she has not had methamphetamines for \"a month or 2;\" however, she did test positive for amphetamines on her UDS.  Additionally, she was positive for marijuana.  Patient does not have any outpatient psychiatric resources.  She reports her mom as social support.  Patient is not on any psychiatric medications. \"  Collateral obtained from mom and sister who report pt was erratic at home, seeing things that were not there and asking family to \"kill her\" and then said, 'Fuck it, I'll just kill myself' and then walked out the door.     Pt now on BHI, reports that her father birthday was on the 27th and she was emotional after going to his gravesite.  Pt states her father was someone who always understood and supported her.  Pt admits to relapsing on methamphetamines this day as well.  Pt acknowledges that she might have

## 2024-08-29 NOTE — DISCHARGE SUMMARY
Discharge Summary    Admit Date: 8/27/2024   Discharge Date:    8/29/2024    Final Dx: Mood disorder (HCC)     At Discharge: 61-70 mild symptoms   All conditions and active problems were treated while patient was hospitalized.     Condition on DC  Mood and affect are stable and pt is not suicidal     VITALS:  /75   Pulse 92   Temp 97.2 °F (36.2 °C) (Infrared)   Resp 16   Ht 1.524 m (5')   Wt 70.3 kg (155 lb)   SpO2 100%   BMI 30.27 kg/m²     Brief Summary Present Illness    Patient is a 39 y.o. female who presents  to [facility] for evaluation after her sister called police stating she was suicidal.  Per tele-psych notes:  \" Patient reports, \"today was my dad's birthday but he has passed away.  We went to the [facility] site and when we got back, me and my sister got into an argument.  I did not want to get into an argument so I walked away.  She is 9 years younger than me and has control issues so she called the  about me being suicidal.  I did not say anything about that.  We all go through the grieving process differently.  I dealt with mine and cried.  He was old and sick.\"  Patient reports that she has upcoming court date for child support and an upcoming job interview.  She reports that she lives with her mom and they \"take care of each other.\"  Patient became uncomfortable/guarded when on the topic of substance use.  Patient reports that she has not had methamphetamines for \"a month or 2;\" however, she did test positive for amphetamines on her UDS.  Additionally, she was positive for marijuana.  Patient does not have any outpatient psychiatric resources.  She reports her mom as social support.  Patient is not on any psychiatric medications. \"  Collateral obtained from mom and sister who report pt was erratic at home, seeing things that were not there and asking family to \"kill her\" and then said, 'Fuck it, I'll just kill myself' and then walked out the door.      Pt now on BHI, reports that  nicotine  1 patch TransDERmal Daily      Hospital PRN Meds: acetaminophen, ibuprofen, magnesium hydroxide, nicotine polacrilex, aluminum & magnesium hydroxide-simethicone, OLANZapine **OR** OLANZapine (ZyPREXA) 10 mg in sterile water 2 mL injection, benztropine mesylate, traZODone, hydrOXYzine HCl   Discharge Meds:    Current Discharge Medication List              Disposition - Residence     Follow Up:  See Discharge Instructions     Total time with patient was 40 minutes and more than 50 % of that time was spent counseling the patient on their symptoms, treatment and expected goals.     Millie Sharif - PMHNP-BC

## 2024-08-29 NOTE — BH NOTE
[facility]  Discharge Note    Pt discharged with followings belongings:   Dental Appliances: None  Vision - Corrective Lenses: None  Hearing Aid: None  Jewelry: Earrings, Ring  Body Piercings Removed: N/A  Clothing: Footwear, Undergarments, Shirt, Pants  Other Valuables: Other (Comment) (sunglasses)   Valuables returned to patient by primary nurse, Krystal. Patient educated on aftercare instructions by primary nurse.  Patient verbalize understanding of AVS:  yes.    Status EXAM upon discharge:  Mental Status and Behavioral Exam  Normal: No  Level of Assistance: Independent/Self  Facial Expression: Worried  Affect: Blunt  Level of Consciousness: Alert  Frequency of Checks: 4 times per hour, close  Mood:Normal: No  Mood: Depressed, Anxious  Motor Activity:Normal: Yes  Motor Activity: Decreased  Eye Contact: Good  Observed Behavior: Friendly, Cooperative, Preoccupied  Sexual Misconduct History: Current - no  Preception: [place] to person, [place] to time, [place] to place, [place] to situation  Attention:Normal: Yes  Thought Processes: Circumstantial  Thought Content:Normal: No  Thought Content: Preoccupations  Depression Symptoms: Appetite change, Feelings of helplessness, Loss of interest  Anxiety Symptoms: Generalized  Deidre Symptoms: No problems reported or observed.  Hallucinations: None  Delusions: No  Memory:Normal: Yes  Memory: Poor recent  Insight and Judgment: No  Insight and Judgment: Poor judgment, Poor insight    Tobacco Screening:  Practical Counseling, on admission, ingris X, if applicable and completed (first 3 are required if patient doesn't refuse):            ( ) Recognizing danger situations (included triggers and roadblocks)                    ( ) Coping skills (new ways to manage stress,relaxation techniques, changing routine, distraction)                                                           ( ) Basic information about quitting (benefits of quitting, techniques in how to quit,

## 2024-08-29 NOTE — TRANSITION OF CARE
Behavioral Health Transition Record    Patient Name: Irma Hwang  YOB: 1985   Medical Record Number: [id number]  Date of Admission: 8/27/2024 10:02 PM   Date of Discharge: 8/29/2024    Attending Provider: Dany Nuñez MD   Discharging Provider: Millie Sharif APRN - CNP  To contact this individual call [phone] and ask the  to page.  If unavailable, ask to be transferred to Behavioral Health Provider on call.  A Behavioral Health Provider will be available on call 24/7 and during holidays.    Primary Care Provider: No primary care provider on file.    No Known Allergies    Reason for Admission: Patient is a 39 y.o.  female who presents for psychiatric evaluation. Patient presented to the ED on 08/28/24 via police after her sister reported her being suicidal.  Patient is agreeable to the interview.  She is alert and oriented x 4, and polite.  Patient's speech was rapid but not pressured.  Patient reports depression and rates her current depression as a \"4\" on a 0-10 scale with 10 being the worst.  Patient denies anxiety.  She denies suicidal and homicidal ideation.  She denies AH and VH.  Patient reports, \"today was my dad's birthday but he has passed away.  We went to [facility] site and when we got back, me and my sister got into an argument.  I did not want to get into an argument so I walked away.  She is 9 years younger than me and has control issues so she called the  about me being suicidal.  I did not say anything about that.  We all go through the grieving process differently.  I dealt with mine and cried.  He was old and sick.\"  Patient reports that she has upcoming court date for child support and an upcoming job interview.  She reports that she lives with her mom and they \"take care of each other.\"  Patient became uncomfortable/guarded when on the topic of substance use.  Patient reports that she has not had methamphetamines for \"a month or 2;\" however,  Plan/Destination: Home: [address] [place], [place] [id number]     Discharge Medication List and Instructions:      Medication List        STOP taking these medications      ibuprofen 200 MG tablet  Commonly known as: ADVIL;MOTRIN     ondansetron 4 MG disintegrating tablet  Commonly known as: ZOFRAN-ODT              Unresulted Labs (24h ago, onward)       Start     Ordered    08/29/24 0600  Lipid Panel  TOMORROW AM,   R         08/28/24 1501    08/29/24 0600  Hemoglobin A1c  TOMORROW AM,   R         08/28/24 1501                    To obtain results of studies pending at discharge, please contact [phone]    Follow-up Information       Follow up With Specialties Details Why Contact [facility] ([facility]) in [place], [place]  Go to You are being referred to [facility] ([facility]) in [place], [place]. They offer walk-in appointments Monday - Friday 8:30am - 4:30pm for registration and assessment.     Please note that [facility] STRONGLY RECOMMENDS that you have follow up care WITHIN 7 DAYS OF DISCHARGE.    Special instructions (what to bring to appointment, etc.): Please bring your photo ID and insurance information. The type/s of services requested are: case management, therapy, and medication management  Agency name: [facility] ([facility])  Address: [address] [place] [address]  Phone Number: [phone]    PCP  Call You do not currently have a primary care provider (PCP). If you would like help getting connected with a PCP, you can contact [employer] for help finding one in your area and getting scheduled for your first appointment. [employer]  Phone: [phone]             Advanced Directive:   Does the patient have an appointed surrogate decision maker? No  Does the patient have a Medical Advance Directive? No  Does the patient have a Psychiatric Advance Directive? No  If the patient does not

## 2024-08-29 NOTE — GROUP NOTE
Group Therapy Note    Date: 8/29/2024    Group Start Time: 1000  Group End Time: 1045  Group Topic: Cognitive Skills    [facility]    Merlene Marie LISW        Group Therapy Note    Attendees: 8    Group members developed abilities to help recognize their own symptoms of anger.   Group members engaged in conversations about symptoms they have had in the past and what skills can be helpful in an attempt to redirect their angry responses into coping skills instead.       Notes:  The group member was present for all of the duration of group. She participated in group discussion and completed an activity that helped her recognize triggers that occur when she is becoming upset, and positive ways to manage them.      Status After Intervention:  Improved    Participation Level: Active Listener and Interactive    Participation Quality: Appropriate, Attentive, and Sharing      Speech:  normal      Thought Process/Content: Logical  Linear      Affective Functioning: Congruent      Mood: euthymic      Level of consciousness:  Alert      Response to Learning: Able to verbalize current knowledge/experience, Able to verbalize/acknowledge new learning, and Able to retain information      Endings: None Reported    Modes of Intervention: Education, Support, Problem-solving, and Activity      Discipline Responsible: /Counselor      Signature:  SAMANTA Andrew

## 2024-08-29 NOTE — PROGRESS NOTES
Behavioral Services  Medicare Certification Upon Admission    I certify that this patient's inpatient psychiatric hospital admission is medically necessary for:    [x] (1) Treatment which could reasonably be expected to improve this patient's condition,       [x] (2) Or for diagnostic study;     AND     [x](2) The inpatient psychiatric services are provided while the individual is under the care of a physician and are included in the individualized plan of care.    Estimated length of stay/service Observation     Plan for post-hospital care outpatient support    Electronically signed by VANIA Jones CNP on 8/29/2024 at 1:39 PM

## 2024-08-29 NOTE — PLAN OF CARE
Problem: Risk for Elopement  Goal: Patient will not exit the unit/facility without proper excort  Outcome: Progressing     Problem: Anxiety  Goal: Will report anxiety at manageable levels  Description: INTERVENTIONS:  1. Administer medication as ordered  2. Teach and rehearse alternative coping skills  3. Provide emotional support with 1:1 interaction with staff  Outcome: Progressing     Problem: Depression/Self Harm  Goal: Effect of psychiatric condition will be minimized and patient will be protected from self harm  Description: INTERVENTIONS:  1. Assess impact of patient's symptoms on level of functioning, self care needs and offer support as indicated  2. Assess patient/family knowledge of depression, impact on illness and need for teaching  3. Provide emotional support, presence and reassurance  4. Assess for possible suicidal thoughts or ideation. If patient expresses suicidal thoughts or statements do not leave alone, initiate Suicide Precautions, move to a room close to the nursing station and obtain sitter  5. Initiate consults as appropriate with Mental Health Professional, Spiritual Care, Psychosocial CNS, and consider a recommendation to the [facility]IP for a Psychiatric Consultation  8/28/2024 2209 by Dari Marquis, RN  Outcome: Progressing     Problem: Involuntary Admit  Goal: Will cooperate with staff recommendations and doctor's orders and will demonstrate appropriate behavior  Description: INTERVENTIONS:  1. Treat underlying conditions and offer medication as ordered  2. Educate regarding involuntary admission procedures and rules  3. Contain excessive/inappropriate behavior per unit and hospital policies  8/28/2024 2209 by Dari Marquis, RN  Outcome: Progressing  8/28/2024 1207 by Francisca Church, RN  Outcome: Not Progressing   Irma has been with drawn to her room majority of evening. She came out to get a snack and something to drink. She denied SI/HI,A/V hallucinations. She is hoping to get out  of the hospital soon and denied any needs this evening. She has not attempted to elope from the unit. She denied any c/o pain. She said that she can be safe because she is not suicidal. Safety checks continue Q 15 minutes through out the shift.

## 2024-08-30 ENCOUNTER — FOLLOWUP TELEPHONE ENCOUNTER (OUTPATIENT)
Dept: PSYCHIATRY | Age: 39
End: 2024-08-30

## 2024-09-03 ENCOUNTER — FOLLOWUP TELEPHONE ENCOUNTER (OUTPATIENT)
Dept: PSYCHIATRY | Age: 39
End: 2024-09-03

## 2024-09-04 ENCOUNTER — FOLLOWUP TELEPHONE ENCOUNTER (OUTPATIENT)
Dept: PSYCHIATRY | Age: 39
End: 2024-09-04